# Patient Record
Sex: FEMALE | Race: BLACK OR AFRICAN AMERICAN | NOT HISPANIC OR LATINO | Employment: FULL TIME | URBAN - NONMETROPOLITAN AREA
[De-identification: names, ages, dates, MRNs, and addresses within clinical notes are randomized per-mention and may not be internally consistent; named-entity substitution may affect disease eponyms.]

---

## 2017-03-21 ENCOUNTER — OFFICE VISIT (OUTPATIENT)
Dept: PODIATRY | Facility: CLINIC | Age: 22
End: 2017-03-21

## 2017-03-21 VITALS
WEIGHT: 220 LBS | DIASTOLIC BLOOD PRESSURE: 74 MMHG | SYSTOLIC BLOOD PRESSURE: 119 MMHG | OXYGEN SATURATION: 96 % | HEART RATE: 69 BPM | HEIGHT: 68 IN | BODY MASS INDEX: 33.34 KG/M2

## 2017-03-21 DIAGNOSIS — L60.0 INGROWN TOENAIL: ICD-10-CM

## 2017-03-21 DIAGNOSIS — M79.671 RIGHT FOOT PAIN: Primary | ICD-10-CM

## 2017-03-21 PROCEDURE — 11750 EXCISION NAIL&NAIL MATRIX: CPT | Performed by: PODIATRIST

## 2017-03-21 PROCEDURE — 99203 OFFICE O/P NEW LOW 30 MIN: CPT | Performed by: PODIATRIST

## 2017-03-21 NOTE — PROGRESS NOTES
"Tanika Osito Molina  1995  21 y.o. female   Patient presents today with a complaint of recurrent ingrown nail right great toe.    3/21/2017  Chief Complaint   Patient presents with   • Right Foot - Ingrown Toenail           History of Present Illness    This is a pleasant 21-year-old female who presents to clinic today with chief complaint of right foot pain.  The pain is located to her right great toe.  States she has recurrent ingrown toenail infections.  She would like something done about them permanently.  Rates the pain as a 4 out of 10 currently.  The pain makes it hard for her to wear her shoe gear.  Denies any injuries to the area.  She has no other pedal complaints.        No past medical history on file.      No past surgical history on file.      No family history on file.      Social History     Social History   • Marital status: Single     Spouse name: N/A   • Number of children: N/A   • Years of education: N/A     Occupational History   • Not on file.     Social History Main Topics   • Smoking status: Never Smoker   • Smokeless tobacco: Not on file   • Alcohol use No   • Drug use: Not on file   • Sexual activity: Not on file     Other Topics Concern   • Not on file     Social History Narrative   • No narrative on file         No current outpatient prescriptions on file.     No current facility-administered medications for this visit.          OBJECTIVE    Visit Vitals   • /74   • Pulse 69   • Ht 68\" (172.7 cm)   • Wt 220 lb (99.8 kg)   • SpO2 96%   • BMI 33.45 kg/m2         Review of Systems   Constitutional: Negative for chills and fever.   Cardiovascular: Negative for chest pain.   Gastrointestinal: Negative for constipation, diarrhea, nausea and vomiting.   Skin: Negative for wound. ingrowing right hallux nail  Musculoskeletal: right foot pain      Constitutional: well developed, well nourished    HEENT: Normocephalic and atraumatic, normal hearing    Respiratory: Non labored " respirations noted    Cardiovascular:    DP/PT pulses palpable    CFT brisk  to all digits  Skin temp is warm to warm from proximal tibia to distal digits  Pedal hair growth present.       Musculoskeletal:  Muscle strength is 5/5 for all muscle groups tested   ROM of the 1st MTP is full without pain or crepitus  ROM of the MTJ is full without pain or crepitus    ROM of the STJ is full without pain or crepitus    ROM of the ankle joint is full without pain or crepitus    Rectus foot type     Dermatological:   Nails 1-5 are within normal limits for length and thickness .  Right hallux nails incurvated and ingrowing on the medial border.  There is tenderness palpation with mild edema no erythema noted.   Skin is warm, dry and intact    Webspaces 1-4 bilateral are clean, dry and intact.   No subcutaneous nodules or masses noted    No open wounds noted     Neurological:   Protective sensation intact    Sensation intact to light touch    DTR intact    Psychiatric: A&O x 3 with normal mood and affect. NAD.           Nail Removal  Date/Time: 3/21/2017 2:54 PM  Performed by: BUSTER SUERO  Authorized by: BUSTER SUERO   Consent: Verbal consent obtained. Written consent obtained.  Risks and benefits: risks, benefits and alternatives were discussed  Consent given by: patient  Patient understanding: patient states understanding of the procedure being performed  Patient identity confirmed: verbally with patient  Nail removal extremity: right hallux.    Anesthesia:  Local Anesthetic: lidocaine 2% without epinephrine   Preparation: skin prepped with Betadine  Amount removed: partial  Nail matrix removed: partial  Dressing: antibiotic ointment and dressing applied  Patient tolerance: Patient tolerated the procedure well with no immediate complications              ASSESSMENT AND PLAN    Tanika was seen today for ingrown toenail.    Diagnoses and all orders for this visit:    Right foot pain    Ingrown toenail    -  Comprehensive foot and ankle exam performed  - Diagnosis, prevention and treatment of ingrown toenails discussed with patient, including risks and potential benefits of nail avulsion both temporary and permanent versus simple debridement.  - Patient elected for a partial permanent nail avulsion  - Dispensed aftercare instruction sheet  - All questions were answered and the patient is in agreement with the current treatment plan.  - RTC in 2 weeks          This document has been electronically signed by Yosef Calloway DPM on March 21, 2017 2:50 PM     3/21/2017  2:50 PM

## 2017-04-04 ENCOUNTER — OFFICE VISIT (OUTPATIENT)
Dept: PODIATRY | Facility: CLINIC | Age: 22
End: 2017-04-04

## 2017-04-04 VITALS — HEIGHT: 68 IN | BODY MASS INDEX: 33.34 KG/M2 | WEIGHT: 220 LBS

## 2017-04-04 DIAGNOSIS — L60.0 INGROWN TOENAIL: Primary | ICD-10-CM

## 2017-04-04 PROCEDURE — 99212 OFFICE O/P EST SF 10 MIN: CPT | Performed by: PODIATRIST

## 2017-04-04 NOTE — PROGRESS NOTES
"Tanika Osito Manningala  1995  21 y.o. female   Patient presents today for a follow-up of the right foot.    4/4/2017  Chief Complaint   Patient presents with   • Right Foot - Follow-up           History of Present Illness    Patient presents to clinic today for follow-up of her ingrown toenail procedure.  She is doing very well at this time.  She has no pain.  She has been dressing and soaking the toe as instructed.  No new pedal complaints.        No past medical history on file.      No past surgical history on file.      No family history on file.      Social History     Social History   • Marital status: Single     Spouse name: N/A   • Number of children: N/A   • Years of education: N/A     Occupational History   • Not on file.     Social History Main Topics   • Smoking status: Never Smoker   • Smokeless tobacco: Not on file   • Alcohol use No   • Drug use: Not on file   • Sexual activity: Not on file     Other Topics Concern   • Not on file     Social History Narrative         No current outpatient prescriptions on file.     No current facility-administered medications for this visit.          OBJECTIVE    Ht 68\" (172.7 cm)  Wt 220 lb (99.8 kg)  BMI 33.45 kg/m2      Review of Systems   Constitutional: Negative for chills and fever.   Cardiovascular: Negative for chest pain.   Gastrointestinal: Negative for constipation, diarrhea, nausea and vomiting.   Skin: Negative for wound. ingrowing right hallux nail  Musculoskeletal: right foot pain      Constitutional: well developed, well nourished    HEENT: Normocephalic and atraumatic, normal hearing    Respiratory: Non labored respirations noted    Cardiovascular:    DP/PT pulses palpable    CFT brisk  to all digits  Skin temp is warm to warm from proximal tibia to distal digits  Pedal hair growth present.       Musculoskeletal:  Muscle strength is 5/5 for all muscle groups tested   ROM of the 1st MTP is full without pain or crepitus  ROM of the MTJ is full " without pain or crepitus    ROM of the STJ is full without pain or crepitus    ROM of the ankle joint is full without pain or crepitus    Rectus foot type     Dermatological:   Nails 1-5 are within normal limits for length and thickness .  Right hallux nail medial border is absent.  No signs of infection.  Skin is warm, dry and intact    Webspaces 1-4 bilateral are clean, dry and intact.   No subcutaneous nodules or masses noted    No open wounds noted     Neurological:   Protective sensation intact    Sensation intact to light touch    DTR intact    Psychiatric: A&O x 3 with normal mood and affect. NAD.           Procedures        ASSESSMENT AND PLAN    Tanika was seen today for follow-up.    Diagnoses and all orders for this visit:    Ingrown toenail    - Patient is doing well at this time.  - Continue soaking and dressing the toe until there is no drainage and a Band-Aid.  Then okay to DC soaks and bandage  - All questions were answered and the patient is in agreement with the current treatment plan.  - RTC prn            This document has been electronically signed by Yosef Calloway DPM on April 4, 2017 1:28 PM     4/4/2017  1:28 PM

## 2018-11-13 PROCEDURE — 87591 N.GONORRHOEAE DNA AMP PROB: CPT | Performed by: NURSE PRACTITIONER

## 2018-11-13 PROCEDURE — 87661 TRICHOMONAS VAGINALIS AMPLIF: CPT | Performed by: NURSE PRACTITIONER

## 2018-11-13 PROCEDURE — 87491 CHLMYD TRACH DNA AMP PROBE: CPT | Performed by: NURSE PRACTITIONER

## 2018-11-14 PROCEDURE — 87660 TRICHOMONAS VAGIN DIR PROBE: CPT | Performed by: NURSE PRACTITIONER

## 2018-11-14 PROCEDURE — 87510 GARDNER VAG DNA DIR PROBE: CPT | Performed by: NURSE PRACTITIONER

## 2018-11-14 PROCEDURE — 87480 CANDIDA DNA DIR PROBE: CPT | Performed by: NURSE PRACTITIONER

## 2018-11-16 ENCOUNTER — TRANSCRIBE ORDERS (OUTPATIENT)
Dept: URGENT CARE | Facility: CLINIC | Age: 23
End: 2018-11-16

## 2018-11-16 DIAGNOSIS — N76.0 BV (BACTERIAL VAGINOSIS): Primary | ICD-10-CM

## 2018-11-16 DIAGNOSIS — B96.89 BV (BACTERIAL VAGINOSIS): Primary | ICD-10-CM

## 2018-11-16 RX ORDER — METRONIDAZOLE 500 MG/1
500 TABLET ORAL 2 TIMES DAILY
Qty: 14 TABLET | Refills: 0 | Status: SHIPPED | OUTPATIENT
Start: 2018-11-16 | End: 2018-11-23

## 2019-06-11 ENCOUNTER — APPOINTMENT (OUTPATIENT)
Dept: ULTRASOUND IMAGING | Facility: HOSPITAL | Age: 24
End: 2019-06-11

## 2019-06-11 ENCOUNTER — HOSPITAL ENCOUNTER (EMERGENCY)
Facility: HOSPITAL | Age: 24
Discharge: HOME | End: 2019-06-11
Attending: FAMILY MEDICINE | Admitting: FAMILY MEDICINE

## 2019-06-11 VITALS
SYSTOLIC BLOOD PRESSURE: 109 MMHG | DIASTOLIC BLOOD PRESSURE: 59 MMHG | HEIGHT: 68 IN | TEMPERATURE: 97.5 F | RESPIRATION RATE: 16 BRPM | WEIGHT: 224.6 LBS | OXYGEN SATURATION: 99 % | HEART RATE: 71 BPM | BODY MASS INDEX: 34.04 KG/M2

## 2019-06-11 DIAGNOSIS — O03.9 MISCARRIAGE: ICD-10-CM

## 2019-06-11 DIAGNOSIS — N93.9 VAGINAL BLEEDING: Primary | ICD-10-CM

## 2019-06-11 LAB
ALBUMIN SERPL-MCNC: 4.1 G/DL (ref 3.5–5.2)
ALBUMIN/GLOB SERPL: 1.5 G/DL
ALP SERPL-CCNC: 57 U/L (ref 39–117)
ALT SERPL W P-5'-P-CCNC: 13 U/L (ref 1–33)
ANION GAP SERPL CALCULATED.3IONS-SCNC: 11 MMOL/L
AST SERPL-CCNC: 15 U/L (ref 1–32)
B-HCG UR QL: POSITIVE
BASOPHILS # BLD AUTO: 0.05 10*3/MM3 (ref 0–0.2)
BASOPHILS NFR BLD AUTO: 0.5 % (ref 0–1.5)
BILIRUB SERPL-MCNC: 0.3 MG/DL (ref 0.2–1.2)
BUN BLD-MCNC: 13 MG/DL (ref 6–20)
BUN/CREAT SERPL: 20 (ref 7–25)
CALCIUM SPEC-SCNC: 9.4 MG/DL (ref 8.6–10.5)
CHLORIDE SERPL-SCNC: 100 MMOL/L (ref 98–107)
CO2 SERPL-SCNC: 27 MMOL/L (ref 22–29)
CREAT BLD-MCNC: 0.65 MG/DL (ref 0.57–1)
DEPRECATED RDW RBC AUTO: 42.3 FL (ref 37–54)
EOSINOPHIL # BLD AUTO: 0.22 10*3/MM3 (ref 0–0.4)
EOSINOPHIL NFR BLD AUTO: 2 % (ref 0.3–6.2)
ERYTHROCYTE [DISTWIDTH] IN BLOOD BY AUTOMATED COUNT: 12.7 % (ref 12.3–15.4)
GFR SERPL CREATININE-BSD FRML MDRD: 138 ML/MIN/1.73
GLOBULIN UR ELPH-MCNC: 2.8 GM/DL
GLUCOSE BLD-MCNC: 100 MG/DL (ref 65–99)
HCG INTACT+B SERPL-ACNC: 787.5 MIU/ML
HCT VFR BLD AUTO: 36.2 % (ref 34–46.6)
HGB BLD-MCNC: 11.6 G/DL (ref 12–15.9)
HOLD SPECIMEN: NORMAL
HOLD SPECIMEN: NORMAL
IMM GRANULOCYTES # BLD AUTO: 0.04 10*3/MM3 (ref 0–0.05)
IMM GRANULOCYTES NFR BLD AUTO: 0.4 % (ref 0–0.5)
LYMPHOCYTES # BLD AUTO: 2.76 10*3/MM3 (ref 0.7–3.1)
LYMPHOCYTES NFR BLD AUTO: 24.9 % (ref 19.6–45.3)
MCH RBC QN AUTO: 29.1 PG (ref 26.6–33)
MCHC RBC AUTO-ENTMCNC: 32 G/DL (ref 31.5–35.7)
MCV RBC AUTO: 91 FL (ref 79–97)
MONOCYTES # BLD AUTO: 0.62 10*3/MM3 (ref 0.1–0.9)
MONOCYTES NFR BLD AUTO: 5.6 % (ref 5–12)
NEUTROPHILS # BLD AUTO: 7.41 10*3/MM3 (ref 1.7–7)
NEUTROPHILS NFR BLD AUTO: 66.6 % (ref 42.7–76)
NRBC BLD AUTO-RTO: 0 /100 WBC (ref 0–0.2)
PLATELET # BLD AUTO: 232 10*3/MM3 (ref 140–450)
PMV BLD AUTO: 9.9 FL (ref 6–12)
POTASSIUM BLD-SCNC: 3.9 MMOL/L (ref 3.5–5.2)
PROT SERPL-MCNC: 6.9 G/DL (ref 6–8.5)
RBC # BLD AUTO: 3.98 10*6/MM3 (ref 3.77–5.28)
SODIUM BLD-SCNC: 138 MMOL/L (ref 136–145)
WBC NRBC COR # BLD: 11.1 10*3/MM3 (ref 3.4–10.8)
WHOLE BLOOD HOLD SPECIMEN: NORMAL
WHOLE BLOOD HOLD SPECIMEN: NORMAL

## 2019-06-11 PROCEDURE — 80053 COMPREHEN METABOLIC PANEL: CPT | Performed by: FAMILY MEDICINE

## 2019-06-11 PROCEDURE — 99284 EMERGENCY DEPT VISIT MOD MDM: CPT

## 2019-06-11 PROCEDURE — 76830 TRANSVAGINAL US NON-OB: CPT

## 2019-06-11 PROCEDURE — 85025 COMPLETE CBC W/AUTO DIFF WBC: CPT | Performed by: FAMILY MEDICINE

## 2019-06-11 PROCEDURE — 84702 CHORIONIC GONADOTROPIN TEST: CPT | Performed by: FAMILY MEDICINE

## 2019-06-11 PROCEDURE — 81025 URINE PREGNANCY TEST: CPT | Performed by: FAMILY MEDICINE

## 2019-06-11 RX ORDER — ACETAMINOPHEN 325 MG/1
650 TABLET ORAL ONCE
Status: COMPLETED | OUTPATIENT
Start: 2019-06-11 | End: 2019-06-11

## 2019-06-11 RX ORDER — ONDANSETRON 4 MG/1
4 TABLET, ORALLY DISINTEGRATING ORAL EVERY 6 HOURS PRN
Qty: 10 TABLET | Refills: 0 | Status: SHIPPED | OUTPATIENT
Start: 2019-06-11

## 2019-06-11 RX ORDER — HYDROCODONE BITARTRATE AND ACETAMINOPHEN 5; 325 MG/1; MG/1
1 TABLET ORAL EVERY 6 HOURS PRN
Qty: 12 TABLET | Refills: 0 | Status: SHIPPED | OUTPATIENT
Start: 2019-06-11 | End: 2022-02-11

## 2019-06-11 RX ADMIN — ACETAMINOPHEN 650 MG: 325 TABLET, FILM COATED ORAL at 09:39

## 2019-06-14 ENCOUNTER — OFFICE VISIT (OUTPATIENT)
Dept: FAMILY MEDICINE CLINIC | Facility: CLINIC | Age: 24
End: 2019-06-14

## 2019-06-14 ENCOUNTER — LAB (OUTPATIENT)
Dept: LAB | Facility: HOSPITAL | Age: 24
End: 2019-06-14

## 2019-06-14 VITALS
OXYGEN SATURATION: 100 % | HEIGHT: 68 IN | BODY MASS INDEX: 32.54 KG/M2 | DIASTOLIC BLOOD PRESSURE: 82 MMHG | HEART RATE: 71 BPM | WEIGHT: 214.7 LBS | SYSTOLIC BLOOD PRESSURE: 130 MMHG

## 2019-06-14 DIAGNOSIS — O03.9 MISCARRIAGE: ICD-10-CM

## 2019-06-14 DIAGNOSIS — Z76.89 ENCOUNTER TO ESTABLISH CARE: Primary | ICD-10-CM

## 2019-06-14 PROCEDURE — 84702 CHORIONIC GONADOTROPIN TEST: CPT | Performed by: STUDENT IN AN ORGANIZED HEALTH CARE EDUCATION/TRAINING PROGRAM

## 2019-06-14 PROCEDURE — 36415 COLL VENOUS BLD VENIPUNCTURE: CPT | Performed by: STUDENT IN AN ORGANIZED HEALTH CARE EDUCATION/TRAINING PROGRAM

## 2019-06-14 PROCEDURE — 99203 OFFICE O/P NEW LOW 30 MIN: CPT | Performed by: STUDENT IN AN ORGANIZED HEALTH CARE EDUCATION/TRAINING PROGRAM

## 2019-06-14 NOTE — PROGRESS NOTES
ID: Tanika Molina    CC:   Chief Complaint   Patient presents with   • Establish Care     recent miscarriage       Subjective:     HPI     Tanika Molina is a 23 y.o. female who presents for ER follow-up and establish care.  She was seen in the emergency room for heavy bleeding.  She was diagnosed with miscarriage.  She reports that the bleeding has decreased somewhat.    She has history of irregular periods, menarche at 14.  Her periods typically last 4 to 12 days.  Sometimes they will occur every 28 days, sometimes she will skip a month.  She is sexually active and has inconsistent condom usage.  Her bleeding was a little bit worse shortly after the ER visit, she is also having some mild low back pain that became moderate when she is working.  She reports some cramping which is improving.  Overall the bleeding is decreasing.  No other concerns or complaints today.    Preventative:  Over the past 2 weeks, have you felt down, depressed, or hopeless? no  Over the past 2 weeks, have you felt little interest or pleasure in doing things? no  Clinical depression screening refused by patient no  PHQ9: 0  On osteoporosis therapy? no    Past Medical Hx:  Past Medical History:   Diagnosis Date   • Anemia    • Asthma        Past Surgical Hx:  Past Surgical History:   Procedure Laterality Date   • ADENOIDECTOMY     • CHOLECYSTECTOMY     • TONSILLECTOMY         Health Maintenance:  Health Maintenance   Topic Date Due   • ANNUAL PHYSICAL  11/14/1999   • HPV VACCINES (1 - Female 3-dose series) 11/14/2011   • PAP SMEAR  11/14/2017   • TDAP/TD VACCINES (2 - Td) 04/26/2019   • INFLUENZA VACCINE  08/01/2019   • CHLAMYDIA SCREENING  11/15/2019   • MENINGOCOCCAL VACCINE (Normal Risk)  Aged Out       Current Meds:    Current Outpatient Medications:   •  HYDROcodone-acetaminophen (NORCO) 5-325 MG per tablet, Take 1 tablet by mouth Every 6 (Six) Hours As Needed for Moderate Pain ., Disp: 12 tablet, Rfl: 0  •  ondansetron ODT  (ZOFRAN-ODT) 4 MG disintegrating tablet, Take 1 tablet by mouth Every 6 (Six) Hours As Needed for Nausea or Vomiting., Disp: 10 tablet, Rfl: 0    Allergies:  Erythromycin; Other; Sulfa antibiotics; and Vantin [cefpodoxime]    Family Hx:  Family History   Problem Relation Age of Onset   • No Known Problems Mother    • No Known Problems Father    • No Known Problems Sister    • No Known Problems Brother    • No Known Problems Daughter    • No Known Problems Son    • No Known Problems Maternal Aunt    • No Known Problems Maternal Uncle    • No Known Problems Paternal Aunt    • No Known Problems Paternal Uncle    • No Known Problems Maternal Grandmother    • No Known Problems Maternal Grandfather    • No Known Problems Paternal Grandmother    • No Known Problems Paternal Grandfather         Social History:  Social History     Socioeconomic History   • Marital status: Single     Spouse name: Not on file   • Number of children: Not on file   • Years of education: Not on file   • Highest education level: Not on file   Tobacco Use   • Smoking status: Never Smoker   • Smokeless tobacco: Never Used   Substance and Sexual Activity   • Alcohol use: Yes     Comment: socially   • Drug use: No       Review of Systems   Constitutional: Negative for activity change, appetite change, fatigue and fever.   HENT: Negative for ear pain and sore throat.    Eyes: Negative for pain and visual disturbance.   Respiratory: Negative for cough and shortness of breath.    Cardiovascular: Negative for chest pain and palpitations.   Gastrointestinal: Negative for abdominal pain and nausea.   Genitourinary: Positive for menstrual problem, pelvic pain and vaginal bleeding. Negative for difficulty urinating and dysuria.   Musculoskeletal: Positive for back pain. Negative for arthralgias and gait problem.   Skin: Negative for color change and rash.   Neurological: Negative for dizziness, weakness and headaches.   Psychiatric/Behavioral: Negative for  "dysphoric mood and sleep disturbance. The patient is not nervous/anxious.            Objective:     /82   Pulse 71   Ht 172.7 cm (68\")   Wt 97.4 kg (214 lb 11.2 oz)   SpO2 100%   BMI 32.65 kg/m²     Physical Exam   Constitutional: She appears well-developed and well-nourished.   HENT:   Head: Normocephalic and atraumatic.   Eyes: Conjunctivae are normal. Pupils are equal, round, and reactive to light.   Neck: Normal range of motion. Neck supple.   Cardiovascular: Normal rate, regular rhythm and normal heart sounds.   Pulmonary/Chest: Effort normal and breath sounds normal.   Abdominal: Soft. Bowel sounds are normal. She exhibits no distension.   Genitourinary: Vagina normal. Uterus is not tender. Cervix exhibits no motion tenderness and no discharge. Right adnexum displays no mass and no tenderness. Left adnexum displays no mass and no tenderness. No bleeding in the vagina.   Musculoskeletal: Normal range of motion.   Neurological: She is alert.   Skin: Skin is warm and dry.   Psychiatric: She has a normal mood and affect. Her behavior is normal.   Nursing note and vitals reviewed.         Assessment/Plan:     Tanika was seen today for establish care.    Diagnoses and all orders for this visit:    Encounter to establish care    Miscarriage  -     HCG, B-subunit, Quantitative  -     HCG, B-subunit, Quantitative; Future  -     HCG, B-subunit, Quantitative; Future  -     US Ob Transvaginal; Future    Will get a repeat ultrasound and trend the hCG downward.      Follow-up:     Return in about 2 weeks (around 6/28/2019) for Recheck.      Goals:   Preventative care    Barriers to goals: None    Health Maintenance   Topic Date Due   • ANNUAL PHYSICAL  11/14/1999   • HPV VACCINES (1 - Female 3-dose series) 11/14/2011   • PAP SMEAR  11/14/2017   • TDAP/TD VACCINES (2 - Td) 04/26/2019   • INFLUENZA VACCINE  08/01/2019   • CHLAMYDIA SCREENING  11/15/2019   • MENINGOCOCCAL VACCINE (Normal Risk)  Aged Out "       Tobacco: nonsmoker  Alcohol: occasional/rare  Lifestyle: Patient's Body mass index is 32.65 kg/m². BMI is above normal parameters. Recommendations include: exercise counseling and nutrition counseling.   eat more fruits and vegetables, decrease soda or juice intake, increase water intake and increase physical activity    RISK SCORE: 3          This document has been electronically signed by Kuldip Peralta MD on June 14, 2019 4:22 PM

## 2019-06-16 LAB — HCG INTACT+B SERPL-ACNC: 593 MIU/ML

## 2019-06-24 ENCOUNTER — APPOINTMENT (OUTPATIENT)
Dept: LAB | Facility: HOSPITAL | Age: 24
End: 2019-06-24

## 2019-06-24 DIAGNOSIS — O03.9 MISCARRIAGE: Primary | ICD-10-CM

## 2019-06-24 PROCEDURE — 36415 COLL VENOUS BLD VENIPUNCTURE: CPT | Performed by: STUDENT IN AN ORGANIZED HEALTH CARE EDUCATION/TRAINING PROGRAM

## 2019-06-24 PROCEDURE — 84702 CHORIONIC GONADOTROPIN TEST: CPT | Performed by: STUDENT IN AN ORGANIZED HEALTH CARE EDUCATION/TRAINING PROGRAM

## 2019-06-26 LAB — HCG INTACT+B SERPL-ACNC: 23 MIU/ML

## 2021-11-11 ENCOUNTER — APPOINTMENT (OUTPATIENT)
Dept: CT IMAGING | Facility: HOSPITAL | Age: 26
End: 2021-11-11

## 2021-11-11 ENCOUNTER — APPOINTMENT (OUTPATIENT)
Dept: GENERAL RADIOLOGY | Facility: HOSPITAL | Age: 26
End: 2021-11-11

## 2021-11-11 ENCOUNTER — HOSPITAL ENCOUNTER (EMERGENCY)
Facility: HOSPITAL | Age: 26
Discharge: ANOTHER HOSPITAL | End: 2021-11-11
Attending: EMERGENCY MEDICINE | Admitting: EMERGENCY MEDICINE

## 2021-11-11 VITALS
HEART RATE: 76 BPM | HEIGHT: 68 IN | WEIGHT: 240 LBS | BODY MASS INDEX: 36.37 KG/M2 | RESPIRATION RATE: 31 BRPM | SYSTOLIC BLOOD PRESSURE: 107 MMHG | OXYGEN SATURATION: 99 % | DIASTOLIC BLOOD PRESSURE: 60 MMHG | TEMPERATURE: 98.2 F

## 2021-11-11 DIAGNOSIS — S22.42XA CLOSED FRACTURE OF MULTIPLE RIBS OF LEFT SIDE, INITIAL ENCOUNTER: Primary | ICD-10-CM

## 2021-11-11 DIAGNOSIS — S42.112A CLOSED DISPLACED FRACTURE OF BODY OF LEFT SCAPULA, INITIAL ENCOUNTER: ICD-10-CM

## 2021-11-11 LAB
ALBUMIN SERPL-MCNC: 4.4 G/DL (ref 3.5–5.2)
ALBUMIN/GLOB SERPL: 1.5 G/DL
ALP SERPL-CCNC: 65 U/L (ref 39–117)
ALT SERPL W P-5'-P-CCNC: 27 U/L (ref 1–33)
ANION GAP SERPL CALCULATED.3IONS-SCNC: 10 MMOL/L (ref 5–15)
AST SERPL-CCNC: 41 U/L (ref 1–32)
BACTERIA UR QL AUTO: ABNORMAL /HPF
BASOPHILS # BLD AUTO: 0.06 10*3/MM3 (ref 0–0.2)
BASOPHILS NFR BLD AUTO: 0.4 % (ref 0–1.5)
BILIRUB SERPL-MCNC: 0.2 MG/DL (ref 0–1.2)
BILIRUB UR QL STRIP: NEGATIVE
BUN SERPL-MCNC: 12 MG/DL (ref 6–20)
BUN/CREAT SERPL: 15.8 (ref 7–25)
CALCIUM SPEC-SCNC: 9.2 MG/DL (ref 8.6–10.5)
CHLORIDE SERPL-SCNC: 102 MMOL/L (ref 98–107)
CLARITY UR: CLEAR
CO2 SERPL-SCNC: 25 MMOL/L (ref 22–29)
COLOR UR: YELLOW
CREAT SERPL-MCNC: 0.76 MG/DL (ref 0.57–1)
DEPRECATED RDW RBC AUTO: 40.5 FL (ref 37–54)
EOSINOPHIL # BLD AUTO: 0.39 10*3/MM3 (ref 0–0.4)
EOSINOPHIL NFR BLD AUTO: 2.3 % (ref 0.3–6.2)
ERYTHROCYTE [DISTWIDTH] IN BLOOD BY AUTOMATED COUNT: 12.6 % (ref 12.3–15.4)
ETHANOL BLD-MCNC: <10 MG/DL (ref 0–10)
ETHANOL UR QL: <0.01 %
GFR SERPL CREATININE-BSD FRML MDRD: 113 ML/MIN/1.73
GLOBULIN UR ELPH-MCNC: 3 GM/DL
GLUCOSE SERPL-MCNC: 103 MG/DL (ref 65–99)
GLUCOSE UR STRIP-MCNC: NEGATIVE MG/DL
HCG SERPL QL: NEGATIVE
HCT VFR BLD AUTO: 38 % (ref 34–46.6)
HGB BLD-MCNC: 12.6 G/DL (ref 12–15.9)
HGB UR QL STRIP.AUTO: ABNORMAL
HOLD SPECIMEN: NORMAL
HOLD SPECIMEN: NORMAL
HYALINE CASTS UR QL AUTO: ABNORMAL /LPF
IMM GRANULOCYTES # BLD AUTO: 0.15 10*3/MM3 (ref 0–0.05)
IMM GRANULOCYTES NFR BLD AUTO: 0.9 % (ref 0–0.5)
KETONES UR QL STRIP: NEGATIVE
LEUKOCYTE ESTERASE UR QL STRIP.AUTO: NEGATIVE
LYMPHOCYTES # BLD AUTO: 5.16 10*3/MM3 (ref 0.7–3.1)
LYMPHOCYTES NFR BLD AUTO: 31 % (ref 19.6–45.3)
MCH RBC QN AUTO: 29.4 PG (ref 26.6–33)
MCHC RBC AUTO-ENTMCNC: 33.2 G/DL (ref 31.5–35.7)
MCV RBC AUTO: 88.8 FL (ref 79–97)
MONOCYTES # BLD AUTO: 0.8 10*3/MM3 (ref 0.1–0.9)
MONOCYTES NFR BLD AUTO: 4.8 % (ref 5–12)
NEUTROPHILS NFR BLD AUTO: 10.11 10*3/MM3 (ref 1.7–7)
NEUTROPHILS NFR BLD AUTO: 60.6 % (ref 42.7–76)
NITRITE UR QL STRIP: NEGATIVE
NRBC BLD AUTO-RTO: 0 /100 WBC (ref 0–0.2)
PH UR STRIP.AUTO: 6.5 [PH] (ref 5–9)
PLATELET # BLD AUTO: 224 10*3/MM3 (ref 140–450)
PMV BLD AUTO: 10.3 FL (ref 6–12)
POTASSIUM SERPL-SCNC: 3.5 MMOL/L (ref 3.5–5.2)
PROT SERPL-MCNC: 7.4 G/DL (ref 6–8.5)
PROT UR QL STRIP: NEGATIVE
RBC # BLD AUTO: 4.28 10*6/MM3 (ref 3.77–5.28)
RBC # UR: ABNORMAL /HPF
REF LAB TEST METHOD: ABNORMAL
SODIUM SERPL-SCNC: 137 MMOL/L (ref 136–145)
SP GR UR STRIP: 1.02 (ref 1–1.03)
SQUAMOUS #/AREA URNS HPF: ABNORMAL /HPF
TROPONIN T SERPL-MCNC: <0.01 NG/ML (ref 0–0.03)
UROBILINOGEN UR QL STRIP: ABNORMAL
WBC # BLD AUTO: 16.67 10*3/MM3 (ref 3.4–10.8)
WBC UR QL AUTO: ABNORMAL /HPF
WHOLE BLOOD HOLD SPECIMEN: NORMAL
WHOLE BLOOD HOLD SPECIMEN: NORMAL

## 2021-11-11 PROCEDURE — 72128 CT CHEST SPINE W/O DYE: CPT

## 2021-11-11 PROCEDURE — 99285 EMERGENCY DEPT VISIT HI MDM: CPT

## 2021-11-11 PROCEDURE — 72125 CT NECK SPINE W/O DYE: CPT

## 2021-11-11 PROCEDURE — 25010000002 KETOROLAC TROMETHAMINE PER 15 MG: Performed by: EMERGENCY MEDICINE

## 2021-11-11 PROCEDURE — 73030 X-RAY EXAM OF SHOULDER: CPT

## 2021-11-11 PROCEDURE — 74177 CT ABD & PELVIS W/CONTRAST: CPT

## 2021-11-11 PROCEDURE — 73590 X-RAY EXAM OF LOWER LEG: CPT

## 2021-11-11 PROCEDURE — 93010 ELECTROCARDIOGRAM REPORT: CPT | Performed by: INTERNAL MEDICINE

## 2021-11-11 PROCEDURE — 81001 URINALYSIS AUTO W/SCOPE: CPT | Performed by: EMERGENCY MEDICINE

## 2021-11-11 PROCEDURE — 80053 COMPREHEN METABOLIC PANEL: CPT | Performed by: EMERGENCY MEDICINE

## 2021-11-11 PROCEDURE — 84484 ASSAY OF TROPONIN QUANT: CPT | Performed by: EMERGENCY MEDICINE

## 2021-11-11 PROCEDURE — 71260 CT THORAX DX C+: CPT

## 2021-11-11 PROCEDURE — 73552 X-RAY EXAM OF FEMUR 2/>: CPT

## 2021-11-11 PROCEDURE — 72131 CT LUMBAR SPINE W/O DYE: CPT

## 2021-11-11 PROCEDURE — 82077 ASSAY SPEC XCP UR&BREATH IA: CPT | Performed by: EMERGENCY MEDICINE

## 2021-11-11 PROCEDURE — 96374 THER/PROPH/DIAG INJ IV PUSH: CPT

## 2021-11-11 PROCEDURE — 85025 COMPLETE CBC W/AUTO DIFF WBC: CPT | Performed by: EMERGENCY MEDICINE

## 2021-11-11 PROCEDURE — 25010000002 IOPAMIDOL 61 % SOLUTION: Performed by: EMERGENCY MEDICINE

## 2021-11-11 PROCEDURE — 84703 CHORIONIC GONADOTROPIN ASSAY: CPT | Performed by: EMERGENCY MEDICINE

## 2021-11-11 PROCEDURE — 70450 CT HEAD/BRAIN W/O DYE: CPT

## 2021-11-11 PROCEDURE — 93005 ELECTROCARDIOGRAM TRACING: CPT | Performed by: EMERGENCY MEDICINE

## 2021-11-11 RX ORDER — KETOROLAC TROMETHAMINE 30 MG/ML
30 INJECTION, SOLUTION INTRAMUSCULAR; INTRAVENOUS ONCE
Status: COMPLETED | OUTPATIENT
Start: 2021-11-11 | End: 2021-11-11

## 2021-11-11 RX ADMIN — IOPAMIDOL 95 ML: 612 INJECTION, SOLUTION INTRAVENOUS at 02:58

## 2021-11-11 RX ADMIN — SODIUM CHLORIDE 1000 ML: 9 INJECTION, SOLUTION INTRAVENOUS at 02:18

## 2021-11-11 RX ADMIN — KETOROLAC TROMETHAMINE 30 MG: 30 INJECTION, SOLUTION INTRAMUSCULAR at 02:18
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
## 2021-11-11 NOTE — ED NOTES
MD contacted [facility] transfer [place] for pt transfer     Birgit Pino, RN  11/11/21 8893
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
## 2021-11-11 NOTE — ED NOTES
Pt shirt removed and placed in gown.  Pt remains in supine position with C collar in place.  Family notified of plan of care and transfer status     Birgit Pino RN  11/11/21 [id number]

## 2021-11-11 NOTE — ED NOTES
Bruising and tender area noted to outer left thigh.  MD notified     Birgit Pino, RN  11/11/21 6413

## 2021-11-15 ENCOUNTER — TRANSCRIBE ORDERS (OUTPATIENT)
Dept: OCCUPATIONAL THERAPY | Facility: HOSPITAL | Age: 26
End: 2021-11-15

## 2021-11-15 DIAGNOSIS — M25.519 SHOULDER PAIN, UNSPECIFIED CHRONICITY, UNSPECIFIED LATERALITY: Primary | ICD-10-CM

## 2021-11-15 LAB
QT INTERVAL: 394 MS
QTC INTERVAL: 434 MS

## 2021-11-16 ENCOUNTER — HOSPITAL ENCOUNTER (OUTPATIENT)
Dept: OCCUPATIONAL THERAPY | Facility: HOSPITAL | Age: 26
Setting detail: THERAPIES SERIES
Discharge: HOME | End: 2021-11-16

## 2021-11-16 DIAGNOSIS — S42.115D CLOSED NONDISPLACED FRACTURE OF BODY OF LEFT SCAPULA WITH ROUTINE HEALING, SUBSEQUENT ENCOUNTER: Primary | ICD-10-CM

## 2021-11-16 DIAGNOSIS — Z78.9 IMPAIRED INSTRUMENTAL ACTIVITIES OF DAILY LIVING (IADL): ICD-10-CM

## 2021-11-16 DIAGNOSIS — Z74.09 IMPAIRED MOBILITY AND ADLS: ICD-10-CM

## 2021-11-16 DIAGNOSIS — Z78.9 IMPAIRED MOBILITY AND ADLS: ICD-10-CM

## 2021-11-16 DIAGNOSIS — M25.612 DECREASED ROM OF LEFT SHOULDER: ICD-10-CM

## 2021-11-16 PROCEDURE — 97166 OT EVAL MOD COMPLEX 45 MIN: CPT
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
## 2021-11-18 ENCOUNTER — HOSPITAL ENCOUNTER (OUTPATIENT)
Dept: OCCUPATIONAL THERAPY | Facility: HOSPITAL | Age: 26
Setting detail: THERAPIES SERIES
Discharge: HOME | End: 2021-11-18

## 2021-11-18 DIAGNOSIS — Z78.9 IMPAIRED MOBILITY AND ADLS: ICD-10-CM

## 2021-11-18 DIAGNOSIS — Z74.09 IMPAIRED MOBILITY AND ADLS: ICD-10-CM

## 2021-11-18 DIAGNOSIS — M25.612 DECREASED ROM OF LEFT SHOULDER: ICD-10-CM

## 2021-11-18 DIAGNOSIS — S42.115D CLOSED NONDISPLACED FRACTURE OF BODY OF LEFT SCAPULA WITH ROUTINE HEALING, SUBSEQUENT ENCOUNTER: Primary | ICD-10-CM

## 2021-11-18 DIAGNOSIS — Z78.9 IMPAIRED INSTRUMENTAL ACTIVITIES OF DAILY LIVING (IADL): ICD-10-CM

## 2021-11-18 PROCEDURE — 97110 THERAPEUTIC EXERCISES: CPT

## 2021-11-18 PROCEDURE — 97140 MANUAL THERAPY 1/> REGIONS: CPT
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
## 2021-11-22 ENCOUNTER — OFFICE VISIT (OUTPATIENT)
Dept: FAMILY MEDICINE CLINIC | Facility: CLINIC | Age: 26
End: 2021-11-22

## 2021-11-22 ENCOUNTER — HOSPITAL ENCOUNTER (OUTPATIENT)
Dept: OCCUPATIONAL THERAPY | Facility: HOSPITAL | Age: 26
Setting detail: THERAPIES SERIES
Discharge: HOME | End: 2021-11-22

## 2021-11-22 VITALS
WEIGHT: 258.6 LBS | DIASTOLIC BLOOD PRESSURE: 80 MMHG | SYSTOLIC BLOOD PRESSURE: 115 MMHG | HEIGHT: 68 IN | HEART RATE: 86 BPM | BODY MASS INDEX: 39.19 KG/M2 | OXYGEN SATURATION: 99 %

## 2021-11-22 DIAGNOSIS — M25.612 DECREASED ROM OF LEFT SHOULDER: ICD-10-CM

## 2021-11-22 DIAGNOSIS — Z76.89 ENCOUNTER TO ESTABLISH CARE: ICD-10-CM

## 2021-11-22 DIAGNOSIS — S42.115D CLOSED NONDISPLACED FRACTURE OF BODY OF LEFT SCAPULA WITH ROUTINE HEALING, SUBSEQUENT ENCOUNTER: Primary | ICD-10-CM

## 2021-11-22 DIAGNOSIS — Z78.9 IMPAIRED INSTRUMENTAL ACTIVITIES OF DAILY LIVING (IADL): ICD-10-CM

## 2021-11-22 DIAGNOSIS — Z74.09 IMPAIRED MOBILITY AND ADLS: ICD-10-CM
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
## 2021-11-22 DIAGNOSIS — F43.10 PTSD (POST-TRAUMATIC STRESS DISORDER): Primary | ICD-10-CM

## 2021-11-22 DIAGNOSIS — Z78.9 IMPAIRED MOBILITY AND ADLS: ICD-10-CM

## 2021-11-22 PROCEDURE — 97110 THERAPEUTIC EXERCISES: CPT

## 2021-11-22 PROCEDURE — 99213 OFFICE O/P EST LOW 20 MIN: CPT | Performed by: STUDENT IN AN ORGANIZED HEALTH CARE EDUCATION/TRAINING PROGRAM

## 2021-11-22 RX ORDER — METHOCARBAMOL 750 MG/1
TABLET, FILM COATED ORAL DAILY PRN
COMMUNITY
Start: 2021-11-18 | End: 2022-02-11

## 2021-11-22 RX ORDER — BUTALBITAL, ACETAMINOPHEN AND CAFFEINE 50; 325; 40 MG/1; MG/1; MG/1
1 TABLET ORAL EVERY 6 HOURS PRN
COMMUNITY
Start: 2021-11-13

## 2021-11-22 RX ORDER — ONDANSETRON 4 MG/1
TABLET, FILM COATED ORAL DAILY PRN
COMMUNITY
Start: 2021-11-18

## 2021-11-22 RX ORDER — TRAMADOL HYDROCHLORIDE 50 MG/1
TABLET ORAL DAILY PRN
COMMUNITY
Start: 2021-11-13 | End: 2022-02-11
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
## 2021-11-24 ENCOUNTER — HOSPITAL ENCOUNTER (OUTPATIENT)
Dept: OCCUPATIONAL THERAPY | Facility: HOSPITAL | Age: 26
Setting detail: THERAPIES SERIES
Discharge: HOME | End: 2021-11-24

## 2021-11-24 DIAGNOSIS — M25.612 DECREASED ROM OF LEFT SHOULDER: ICD-10-CM

## 2021-11-24 DIAGNOSIS — S42.115D CLOSED NONDISPLACED FRACTURE OF BODY OF LEFT SCAPULA WITH ROUTINE HEALING, SUBSEQUENT ENCOUNTER: Primary | ICD-10-CM

## 2021-11-24 DIAGNOSIS — Z78.9 IMPAIRED MOBILITY AND ADLS: ICD-10-CM

## 2021-11-24 DIAGNOSIS — Z78.9 IMPAIRED INSTRUMENTAL ACTIVITIES OF DAILY LIVING (IADL): ICD-10-CM

## 2021-11-24 DIAGNOSIS — Z74.09 IMPAIRED MOBILITY AND ADLS: ICD-10-CM

## 2021-11-24 PROCEDURE — 97110 THERAPEUTIC EXERCISES: CPT
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
## 2021-11-29 ENCOUNTER — APPOINTMENT (OUTPATIENT)
Dept: OCCUPATIONAL THERAPY | Facility: HOSPITAL | Age: 26
End: 2021-11-29

## 2021-12-01 ENCOUNTER — HOSPITAL ENCOUNTER (OUTPATIENT)
Dept: OCCUPATIONAL THERAPY | Facility: HOSPITAL | Age: 26
Setting detail: THERAPIES SERIES
Discharge: HOME | End: 2021-12-01

## 2021-12-01 DIAGNOSIS — M25.612 DECREASED ROM OF LEFT SHOULDER: ICD-10-CM

## 2021-12-01 DIAGNOSIS — Z74.09 IMPAIRED MOBILITY AND ADLS: ICD-10-CM

## 2021-12-01 DIAGNOSIS — Z78.9 IMPAIRED INSTRUMENTAL ACTIVITIES OF DAILY LIVING (IADL): ICD-10-CM

## 2021-12-01 DIAGNOSIS — S42.115D CLOSED NONDISPLACED FRACTURE OF BODY OF LEFT SCAPULA WITH ROUTINE HEALING, SUBSEQUENT ENCOUNTER: Primary | ICD-10-CM

## 2021-12-01 DIAGNOSIS — Z78.9 IMPAIRED MOBILITY AND ADLS: ICD-10-CM

## 2021-12-01 PROCEDURE — 97110 THERAPEUTIC EXERCISES: CPT
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
## 2021-12-02 ENCOUNTER — TRANSCRIBE ORDERS (OUTPATIENT)
Dept: PHYSICAL THERAPY | Facility: HOSPITAL | Age: 26
End: 2021-12-02

## 2021-12-02 DIAGNOSIS — G44.319 ACUTE POST-TRAUMATIC HEADACHE, NOT INTRACTABLE: ICD-10-CM

## 2021-12-02 DIAGNOSIS — R94.113 ABNORMAL RESULT OF EVALUATION OF OCULOMOTOR SYSTEM: ICD-10-CM

## 2021-12-02 DIAGNOSIS — S06.0X0A CONCUSSION WITHOUT LOSS OF CONSCIOUSNESS, INITIAL ENCOUNTER: Primary | ICD-10-CM

## 2021-12-06 ENCOUNTER — HOSPITAL ENCOUNTER (OUTPATIENT)
Dept: OCCUPATIONAL THERAPY | Facility: HOSPITAL | Age: 26
Setting detail: THERAPIES SERIES
Discharge: HOME | End: 2021-12-06

## 2021-12-06 DIAGNOSIS — Z74.09 IMPAIRED MOBILITY AND ADLS: ICD-10-CM

## 2021-12-06 DIAGNOSIS — S42.115D CLOSED NONDISPLACED FRACTURE OF BODY OF LEFT SCAPULA WITH ROUTINE HEALING, SUBSEQUENT ENCOUNTER: Primary | ICD-10-CM

## 2021-12-06 DIAGNOSIS — M25.612 DECREASED ROM OF LEFT SHOULDER: ICD-10-CM

## 2021-12-06 DIAGNOSIS — Z78.9 IMPAIRED INSTRUMENTAL ACTIVITIES OF DAILY LIVING (IADL): ICD-10-CM

## 2021-12-06 DIAGNOSIS — Z78.9 IMPAIRED MOBILITY AND ADLS: ICD-10-CM

## 2021-12-06 PROCEDURE — 97110 THERAPEUTIC EXERCISES: CPT
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
## 2021-12-09 ENCOUNTER — HOSPITAL ENCOUNTER (OUTPATIENT)
Dept: OCCUPATIONAL THERAPY | Facility: HOSPITAL | Age: 26
Setting detail: THERAPIES SERIES
Discharge: HOME | End: 2021-12-09

## 2021-12-09 DIAGNOSIS — Z78.9 IMPAIRED INSTRUMENTAL ACTIVITIES OF DAILY LIVING (IADL): ICD-10-CM

## 2021-12-09 DIAGNOSIS — M25.612 DECREASED ROM OF LEFT SHOULDER: ICD-10-CM

## 2021-12-09 DIAGNOSIS — Z74.09 IMPAIRED MOBILITY AND ADLS: ICD-10-CM

## 2021-12-09 DIAGNOSIS — Z78.9 IMPAIRED MOBILITY AND ADLS: ICD-10-CM

## 2021-12-09 DIAGNOSIS — S42.115D CLOSED NONDISPLACED FRACTURE OF BODY OF LEFT SCAPULA WITH ROUTINE HEALING, SUBSEQUENT ENCOUNTER: Primary | ICD-10-CM

## 2021-12-09 PROCEDURE — 97110 THERAPEUTIC EXERCISES: CPT

## 2021-12-09 PROCEDURE — 97530 THERAPEUTIC ACTIVITIES: CPT
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
## 2021-12-13 ENCOUNTER — HOSPITAL ENCOUNTER (OUTPATIENT)
Dept: PHYSICAL THERAPY | Facility: HOSPITAL | Age: 26
Setting detail: THERAPIES SERIES
Discharge: HOME | End: 2021-12-13

## 2021-12-13 ENCOUNTER — HOSPITAL ENCOUNTER (OUTPATIENT)
Dept: OCCUPATIONAL THERAPY | Facility: HOSPITAL | Age: 26
Setting detail: THERAPIES SERIES
Discharge: HOME | End: 2021-12-13

## 2021-12-13 DIAGNOSIS — S42.115D CLOSED NONDISPLACED FRACTURE OF BODY OF LEFT SCAPULA WITH ROUTINE HEALING, SUBSEQUENT ENCOUNTER: Primary | ICD-10-CM

## 2021-12-13 DIAGNOSIS — Z78.9 IMPAIRED MOBILITY AND ADLS: ICD-10-CM

## 2021-12-13 DIAGNOSIS — S06.0X0A CONCUSSION WITHOUT LOSS OF CONSCIOUSNESS, INITIAL ENCOUNTER: Primary | ICD-10-CM

## 2021-12-13 DIAGNOSIS — R94.113 ABNORMAL RESULT OF EVALUATION OF OCULOMOTOR SYSTEM: ICD-10-CM

## 2021-12-13 DIAGNOSIS — Z74.09 IMPAIRED MOBILITY AND ADLS: ICD-10-CM

## 2021-12-13 DIAGNOSIS — G44.319 ACUTE POST-TRAUMATIC HEADACHE, NOT INTRACTABLE: ICD-10-CM

## 2021-12-13 DIAGNOSIS — M25.612 DECREASED ROM OF LEFT SHOULDER: ICD-10-CM

## 2021-12-13 DIAGNOSIS — Z78.9 IMPAIRED INSTRUMENTAL ACTIVITIES OF DAILY LIVING (IADL): ICD-10-CM

## 2021-12-13 PROCEDURE — 97163 PT EVAL HIGH COMPLEX 45 MIN: CPT | Performed by: PHYSICAL THERAPIST

## 2021-12-13 PROCEDURE — 97110 THERAPEUTIC EXERCISES: CPT
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
## 2021-12-14 ENCOUNTER — OFFICE VISIT (OUTPATIENT)
Dept: FAMILY MEDICINE CLINIC | Facility: CLINIC | Age: 26
End: 2021-12-14

## 2021-12-14 VITALS
WEIGHT: 262.5 LBS | HEART RATE: 68 BPM | BODY MASS INDEX: 39.78 KG/M2 | SYSTOLIC BLOOD PRESSURE: 122 MMHG | OXYGEN SATURATION: 99 % | HEIGHT: 68 IN | DIASTOLIC BLOOD PRESSURE: 72 MMHG

## 2021-12-14 DIAGNOSIS — F43.10 PTSD (POST-TRAUMATIC STRESS DISORDER): Primary | ICD-10-CM

## 2021-12-14 PROCEDURE — 99213 OFFICE O/P EST LOW 20 MIN: CPT | Performed by: STUDENT IN AN ORGANIZED HEALTH CARE EDUCATION/TRAINING PROGRAM

## 2021-12-14 RX ORDER — AMITRIPTYLINE HYDROCHLORIDE 10 MG/1
TABLET, FILM COATED ORAL
COMMUNITY
Start: 2021-11-29
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
## 2021-12-16 ENCOUNTER — APPOINTMENT (OUTPATIENT)
Dept: OCCUPATIONAL THERAPY | Facility: HOSPITAL | Age: 26
End: 2021-12-16

## 2021-12-16 ENCOUNTER — APPOINTMENT (OUTPATIENT)
Dept: PHYSICAL THERAPY | Facility: HOSPITAL | Age: 26
End: 2021-12-16

## 2021-12-16 NOTE — PROGRESS NOTES
I have spoken with the patient .     I have reviewed the notes, assessments, and/or procedures performed by Dr. Pina Hyman,   I concur with   her  documentation and assessment and plan for Tanika Molina.          This document has been electronically signed by eRagan Rizvi MD on December 16, 2021 11:35 CST

## 2021-12-21 ENCOUNTER — HOSPITAL ENCOUNTER (OUTPATIENT)
Dept: OCCUPATIONAL THERAPY | Facility: HOSPITAL | Age: 26
Setting detail: THERAPIES SERIES
Discharge: HOME | End: 2021-12-21

## 2021-12-21 ENCOUNTER — HOSPITAL ENCOUNTER (OUTPATIENT)
Dept: PHYSICAL THERAPY | Facility: HOSPITAL | Age: 26
Setting detail: THERAPIES SERIES
Discharge: HOME | End: 2021-12-21

## 2021-12-21 DIAGNOSIS — Z74.09 IMPAIRED MOBILITY AND ADLS: ICD-10-CM

## 2021-12-21 DIAGNOSIS — R94.113 ABNORMAL RESULT OF EVALUATION OF OCULOMOTOR SYSTEM: ICD-10-CM

## 2021-12-21 DIAGNOSIS — Z78.9 IMPAIRED MOBILITY AND ADLS: ICD-10-CM

## 2021-12-21 DIAGNOSIS — Z78.9 IMPAIRED INSTRUMENTAL ACTIVITIES OF DAILY LIVING (IADL): ICD-10-CM

## 2021-12-21 DIAGNOSIS — G44.319 ACUTE POST-TRAUMATIC HEADACHE, NOT INTRACTABLE: ICD-10-CM
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
## 2021-12-21 DIAGNOSIS — S06.0X0A CONCUSSION WITHOUT LOSS OF CONSCIOUSNESS, INITIAL ENCOUNTER: Primary | ICD-10-CM

## 2021-12-21 DIAGNOSIS — S42.115D CLOSED NONDISPLACED FRACTURE OF BODY OF LEFT SCAPULA WITH ROUTINE HEALING, SUBSEQUENT ENCOUNTER: Primary | ICD-10-CM

## 2021-12-21 PROCEDURE — 97110 THERAPEUTIC EXERCISES: CPT

## 2021-12-21 PROCEDURE — 97530 THERAPEUTIC ACTIVITIES: CPT | Performed by: PHYSICAL THERAPIST
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
## 2021-12-29 ENCOUNTER — HOSPITAL ENCOUNTER (OUTPATIENT)
Dept: OCCUPATIONAL THERAPY | Facility: HOSPITAL | Age: 26
Setting detail: THERAPIES SERIES
Discharge: HOME | End: 2021-12-29

## 2021-12-29 ENCOUNTER — HOSPITAL ENCOUNTER (OUTPATIENT)
Dept: PHYSICAL THERAPY | Facility: HOSPITAL | Age: 26
Setting detail: THERAPIES SERIES
Discharge: HOME | End: 2021-12-29

## 2021-12-29 DIAGNOSIS — G44.319 ACUTE POST-TRAUMATIC HEADACHE, NOT INTRACTABLE: ICD-10-CM

## 2021-12-29 DIAGNOSIS — M25.612 DECREASED ROM OF LEFT SHOULDER: ICD-10-CM

## 2021-12-29 DIAGNOSIS — R94.113 ABNORMAL RESULT OF EVALUATION OF OCULOMOTOR SYSTEM: ICD-10-CM
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
## 2021-12-29 DIAGNOSIS — Z78.9 IMPAIRED INSTRUMENTAL ACTIVITIES OF DAILY LIVING (IADL): ICD-10-CM

## 2021-12-29 DIAGNOSIS — Z74.09 IMPAIRED MOBILITY AND ADLS: ICD-10-CM

## 2021-12-29 DIAGNOSIS — S42.115D CLOSED NONDISPLACED FRACTURE OF BODY OF LEFT SCAPULA WITH ROUTINE HEALING, SUBSEQUENT ENCOUNTER: Primary | ICD-10-CM

## 2021-12-29 DIAGNOSIS — S06.0X0A CONCUSSION WITHOUT LOSS OF CONSCIOUSNESS, INITIAL ENCOUNTER: Primary | ICD-10-CM

## 2021-12-29 DIAGNOSIS — Z78.9 IMPAIRED MOBILITY AND ADLS: ICD-10-CM

## 2021-12-29 PROCEDURE — 97110 THERAPEUTIC EXERCISES: CPT

## 2021-12-29 PROCEDURE — 97530 THERAPEUTIC ACTIVITIES: CPT
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
## 2022-01-03 ENCOUNTER — APPOINTMENT (OUTPATIENT)
Dept: PHYSICAL THERAPY | Facility: HOSPITAL | Age: 27
End: 2022-01-03

## 2022-01-03 ENCOUNTER — APPOINTMENT (OUTPATIENT)
Dept: OCCUPATIONAL THERAPY | Facility: HOSPITAL | Age: 27
End: 2022-01-03

## 2022-01-05 ENCOUNTER — APPOINTMENT (OUTPATIENT)
Dept: PHYSICAL THERAPY | Facility: HOSPITAL | Age: 27
End: 2022-01-05

## 2022-01-05 ENCOUNTER — APPOINTMENT (OUTPATIENT)
Dept: OCCUPATIONAL THERAPY | Facility: HOSPITAL | Age: 27
End: 2022-01-05

## 2022-01-07 ENCOUNTER — HOSPITAL ENCOUNTER (OUTPATIENT)
Dept: PHYSICAL THERAPY | Facility: HOSPITAL | Age: 27
Setting detail: THERAPIES SERIES
Discharge: HOME | End: 2022-01-07

## 2022-01-07 DIAGNOSIS — S06.0X0A CONCUSSION WITHOUT LOSS OF CONSCIOUSNESS, INITIAL ENCOUNTER: Primary | ICD-10-CM

## 2022-01-07 DIAGNOSIS — R94.113 ABNORMAL RESULT OF EVALUATION OF OCULOMOTOR SYSTEM: ICD-10-CM

## 2022-01-07 DIAGNOSIS — G44.319 ACUTE POST-TRAUMATIC HEADACHE, NOT INTRACTABLE: ICD-10-CM

## 2022-01-07 PROCEDURE — 97530 THERAPEUTIC ACTIVITIES: CPT | Performed by: PHYSICAL THERAPIST
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
## 2022-01-10 ENCOUNTER — APPOINTMENT (OUTPATIENT)
Dept: PHYSICAL THERAPY | Facility: HOSPITAL | Age: 27
End: 2022-01-10

## 2022-01-14 ENCOUNTER — HOSPITAL ENCOUNTER (OUTPATIENT)
Dept: PHYSICAL THERAPY | Facility: HOSPITAL | Age: 27
Setting detail: THERAPIES SERIES
Discharge: HOME | End: 2022-01-14

## 2022-01-14 DIAGNOSIS — S06.0X0A CONCUSSION WITHOUT LOSS OF CONSCIOUSNESS, INITIAL ENCOUNTER: Primary | ICD-10-CM

## 2022-01-14 DIAGNOSIS — G44.319 ACUTE POST-TRAUMATIC HEADACHE, NOT INTRACTABLE: ICD-10-CM

## 2022-01-14 DIAGNOSIS — R94.113 ABNORMAL RESULT OF EVALUATION OF OCULOMOTOR SYSTEM: ICD-10-CM

## 2022-01-14 PROCEDURE — 97530 THERAPEUTIC ACTIVITIES: CPT | Performed by: PHYSICAL THERAPIST
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
## 2022-01-21 ENCOUNTER — APPOINTMENT (OUTPATIENT)
Dept: PHYSICAL THERAPY | Facility: HOSPITAL | Age: 27
End: 2022-01-21

## 2022-01-28 ENCOUNTER — HOSPITAL ENCOUNTER (OUTPATIENT)
Dept: PHYSICAL THERAPY | Facility: HOSPITAL | Age: 27
Setting detail: THERAPIES SERIES
Discharge: HOME | End: 2022-01-28

## 2022-01-28 DIAGNOSIS — R94.113 ABNORMAL RESULT OF EVALUATION OF OCULOMOTOR SYSTEM: ICD-10-CM

## 2022-01-28 DIAGNOSIS — G44.319 ACUTE POST-TRAUMATIC HEADACHE, NOT INTRACTABLE: ICD-10-CM

## 2022-01-28 DIAGNOSIS — S06.0X0A CONCUSSION WITHOUT LOSS OF CONSCIOUSNESS, INITIAL ENCOUNTER: Primary | ICD-10-CM

## 2022-01-28 PROCEDURE — 97530 THERAPEUTIC ACTIVITIES: CPT | Performed by: PHYSICAL THERAPIST
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
## 2022-02-02 ENCOUNTER — APPOINTMENT (OUTPATIENT)
Dept: PHYSICAL THERAPY | Facility: HOSPITAL | Age: 27
End: 2022-02-02

## 2022-02-04 ENCOUNTER — APPOINTMENT (OUTPATIENT)
Dept: PHYSICAL THERAPY | Facility: HOSPITAL | Age: 27
End: 2022-02-04

## 2022-02-11 ENCOUNTER — OFFICE VISIT (OUTPATIENT)
Dept: FAMILY MEDICINE CLINIC | Facility: CLINIC | Age: 27
End: 2022-02-11

## 2022-02-11 ENCOUNTER — APPOINTMENT (OUTPATIENT)
Dept: PHYSICAL THERAPY | Facility: HOSPITAL | Age: 27
End: 2022-02-11

## 2022-02-11 VITALS
OXYGEN SATURATION: 99 % | DIASTOLIC BLOOD PRESSURE: 70 MMHG | WEIGHT: 268.6 LBS | HEIGHT: 68 IN | TEMPERATURE: 98.2 F | SYSTOLIC BLOOD PRESSURE: 132 MMHG | HEART RATE: 81 BPM | BODY MASS INDEX: 40.71 KG/M2

## 2022-02-11 DIAGNOSIS — F43.10 PTSD (POST-TRAUMATIC STRESS DISORDER): Primary | ICD-10-CM

## 2022-02-11 PROCEDURE — 99213 OFFICE O/P EST LOW 20 MIN: CPT | Performed by: STUDENT IN AN ORGANIZED HEALTH CARE EDUCATION/TRAINING PROGRAM
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
## 2022-02-18 ENCOUNTER — HOSPITAL ENCOUNTER (OUTPATIENT)
Dept: PHYSICAL THERAPY | Facility: HOSPITAL | Age: 27
Setting detail: THERAPIES SERIES
Discharge: HOME | End: 2022-02-18

## 2022-02-18 DIAGNOSIS — G44.319 ACUTE POST-TRAUMATIC HEADACHE, NOT INTRACTABLE: ICD-10-CM

## 2022-02-18 DIAGNOSIS — S06.0X0A CONCUSSION WITHOUT LOSS OF CONSCIOUSNESS, INITIAL ENCOUNTER: Primary | ICD-10-CM

## 2022-02-18 DIAGNOSIS — R94.113 ABNORMAL RESULT OF EVALUATION OF OCULOMOTOR SYSTEM: ICD-10-CM

## 2022-02-18 PROCEDURE — 97530 THERAPEUTIC ACTIVITIES: CPT | Performed by: PHYSICAL THERAPIST
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
## 2022-02-25 ENCOUNTER — APPOINTMENT (OUTPATIENT)
Dept: PHYSICAL THERAPY | Facility: HOSPITAL | Age: 27
End: 2022-02-25

## 2022-03-04 ENCOUNTER — HOSPITAL ENCOUNTER (OUTPATIENT)
Dept: PHYSICAL THERAPY | Facility: HOSPITAL | Age: 27
Setting detail: THERAPIES SERIES
Discharge: HOME | End: 2022-03-04

## 2022-03-04 DIAGNOSIS — R94.113 ABNORMAL RESULT OF EVALUATION OF OCULOMOTOR SYSTEM: ICD-10-CM

## 2022-03-04 DIAGNOSIS — S06.0X0A CONCUSSION WITHOUT LOSS OF CONSCIOUSNESS, INITIAL ENCOUNTER: Primary | ICD-10-CM

## 2022-03-04 DIAGNOSIS — G44.319 ACUTE POST-TRAUMATIC HEADACHE, NOT INTRACTABLE: ICD-10-CM

## 2022-03-04 PROCEDURE — 97530 THERAPEUTIC ACTIVITIES: CPT | Performed by: PHYSICAL THERAPIST
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
## 2022-03-11 ENCOUNTER — APPOINTMENT (OUTPATIENT)
Dept: PHYSICAL THERAPY | Facility: HOSPITAL | Age: 27
End: 2022-03-11

## 2022-03-18 ENCOUNTER — HOSPITAL ENCOUNTER (OUTPATIENT)
Dept: PHYSICAL THERAPY | Facility: HOSPITAL | Age: 27
Setting detail: THERAPIES SERIES
Discharge: HOME | End: 2022-03-18

## 2022-03-18 DIAGNOSIS — R94.113 ABNORMAL RESULT OF EVALUATION OF OCULOMOTOR SYSTEM: ICD-10-CM

## 2022-03-18 DIAGNOSIS — S06.0X0A CONCUSSION WITHOUT LOSS OF CONSCIOUSNESS, INITIAL ENCOUNTER: Primary | ICD-10-CM

## 2022-03-18 DIAGNOSIS — G44.319 ACUTE POST-TRAUMATIC HEADACHE, NOT INTRACTABLE: ICD-10-CM

## 2022-03-18 PROCEDURE — 97530 THERAPEUTIC ACTIVITIES: CPT | Performed by: PHYSICAL THERAPIST

## 2022-03-18 NOTE — THERAPY DISCHARGE NOTE
"     Outpatient Physical Therapy Vestibular Progress Note/Discharge Summary  [facility]     Patient Name: Tanika Molina  : 1995  MRN: [id number]  Today's Date: 3/18/2022      Visit Date: 2022    Attendance:    Subjective Improvement: 90-95%  Next MD Appt: 3/29/22     Therapy Diagnosis: 1) Concussion; 2) Oculomotor dysfunction    Changes in Medications: none noted  Changes in MD Orders: none noted  Number of Work Days Lost: 2-2.5 months       Visit Dx:     ICD-10-CM ICD-9-CM   1. Concussion without loss of consciousness, initial encounter  S06.0X0A 850.0   2. Acute post-traumatic headache, not intractable  G44.319 339.21   3. Abnormal result of evaluation of oculomotor system  R94.113 794.14             Vestibular Eval     Row Name 22 1000             Subjective Comments    Subjective Comments \"I'm doing good. I'm staying tired from work.\" Concussion symptoms have \"improved a lot.\" Over the past week, she has noticed an improvement in her ability to see things in her peripheral vision. Her ability to concentrate and multi-task is improving as well. Scrolling is okay \"unless I get to going too fast.\" Able to readjust and focus later. Wearing glasses more. Glasses haven't really helped when driving, but is helping when reading. 90-95% subjective improvement. No medication changes. Returns to MD 3/29/22.  -SS              Vestibular Objective    General Observation No acute distress. Eyes appear symmetrical. Slight decrease in distance following object in peripheral vision to R compared to L.  -SS              Oculomotor Exam Fixation Present    Smooth Pursuit Normal  -SS      Saccades Intact  -SS            User Key  (r) = Recorded By, (t) = Taken By, (c) = Cosigned By    Initials Name Provider Type    SS Francois Stephens, PT DPT Physical Therapist                             PT Assessment/Plan     Row Name 22 1000          PT Assessment    Functional Limitations " "Limitation in home management;Limitations in community activities;Limitations in functional capacity and performance;Performance in work activities  -     Impairments Impaired cranial nerve integrity  -     Assessment Comments Significant improvement in vertigo and concussion symptoms. Independent with HEP/self-management.  -SS     Rehab Potential Good  -SS     Patient/caregiver participated in establishment of treatment plan and goals Yes  -SS     Patient would benefit from skilled therapy intervention No  -SS            PT Plan    PT Plan Comments D/C P.T. to HEP  -SS           User Key  (r) = Recorded By, (t) = Taken By, (c) = Cosigned By    Initials Name Provider Type    SS Francois Stephens, PT DPT Physical Therapist                      OP Exercises     Row Name 03/18/22 1000             Subjective Comments    Subjective Comments \"I'm doing good. I'm staying tired from work.\" Concussion symptoms have \"improved a lot.\" Over the past week, she has noticed an improvement in her ability to see things in her peripheral vision. Her ability to concentrate and multi-task is improving as well. Scrolling is okay \"unless I get to going too fast.\" Able to readjust and focus later. Wearing glasses more. Glasses haven't really helped when driving, but is helping when reading. 90-95% subjective improvement. No medication changes. Returns to MD 3/29/22.  -SS              Exercise 1    Exercise Name 1 recheck  -SS              Exercise 2    Exercise Name 2 EFX with arms  -      Cueing 2 Verbal  -SS      Time 2 10 mins  -SS      Additional Comments quickstart  -SS              Exercise 3    Exercise Name 3 Toss ball from hand to hand with visual tracking  -      Cueing 3 Verbal;Demo  -SS      Sets 3 1x without verbal distraction, 1 time with verbal distraction  -SS      Time 3 1 min  -SS      Additional Comments small black ball  -SS              Exercise 4    Exercise Name 4 Ball toss  -      Cueing 4 Verbal  " -SS      Time 4 3 mins  -SS              Exercise 5    Exercise Name 5 Optokinetic left to right  -SS      Cueing 5 Verbal  -SS      Time 5 1 min  -SS              Exercise 6    Exercise Name 6 Optokinetic right to left  -SS      Cueing 6 Verbal  -SS      Time 6 1 min  -SS            User Key  (r) = Recorded By, (t) = Taken By, (c) = Cosigned By    Initials Name Provider Type    Francois Stewart, PT DPT Physical Therapist                               PT OP Goals     Row Name 03/18/22 1000          PT Short Term Goals    STG Date to Achieve --  further STGs deferred  -SS            Long Term Goals    LTG Date to Achieve 03/18/22  -SS     LTG 1 Independent with HEP/self-management.  -SS     LTG 1 Progress Met  -SS     LTG 2 Return to work.  -SS     LTG 2 Progress Met  -SS     LTG 3 Normal oculomotor responses.  -SS     LTG 3 Progress Met  -SS     LTG 4 Resolution of post-concussion dizziness and headaches.  -SS     LTG 4 Progress Met  -SS     LTG 5 Resume PLOF.  -SS     LTG 5 Progress Met  -SS           User Key  (r) = Recorded By, (t) = Taken By, (c) = Cosigned By    Initials Name Provider Type    Francois Stewart, PT DPT Physical Therapist                Therapy Education  Given: HEP  Program: Reinforced  How Provided: Verbal  Provided to: Patient  Level of Understanding: Verbalized              Time Calculation:   Start Time: 1017  Stop Time: 1057  Time Calculation (min): 40 min     Therapy Charges for Today     Code Description Service Date Service Provider Modifiers Qty    [id number] HC PT THERAPEUTIC ACT EA 15 MIN 3/18/2022 Francois Stephens, PT DPT GP 3                OP PT Discharge Summary  Date of Discharge: 03/18/22  Reason for Discharge: All goals achieved  Outcomes Achieved: Able to achieve all goals within established timeline  Discharge Destination: Home with home program        Francois Stephens, PT, DPT, CHT  3/18/2022

## 2022-03-25 ENCOUNTER — APPOINTMENT (OUTPATIENT)
Dept: PHYSICAL THERAPY | Facility: HOSPITAL | Age: 27
End: 2022-03-25

## 2022-04-01 DIAGNOSIS — F43.10 PTSD (POST-TRAUMATIC STRESS DISORDER): ICD-10-CM

## 2022-06-26 ENCOUNTER — E-VISIT (OUTPATIENT)
Dept: FAMILY MEDICINE CLINIC | Facility: TELEHEALTH | Age: 27
End: 2022-06-26

## 2022-06-26 PROCEDURE — BRIGHTMDVISIT: Performed by: NURSE PRACTITIONER

## 2022-06-26 NOTE — EXTERNAL PATIENT INSTRUCTIONS
Diagnosis   Scabies   My name is Kristy Junior, and I'm a healthcare provider at [facility]. After reviewing your responses and photos, I see that you have scabies. Scabies is an extremely itchy skin condition caused by tiny insects called mites that have burrowed under the skin.   Medications   Your pharmacy   [facility] [phone] [place] [phone]     Prescription   Permethrin topical cream (5%): For scabies. Apply thin film once on the first day. After 7 to 14 days, apply again. Apply to all skin from neck to the soles of the feet, including areas under fingernails and toenails. If the scalp is involved, apply to scalp and face, avoiding the eyes and mouth. Leave on skin for 8 to 14 hours, then wash off. All household members should be treated at the same time.   About your diagnosis   Scabies is a highly contagious skin condition that makes your skin very itchy. It can spread easily through close physical contact. You mentioned being around someone with scabies or a rash that's similar to yours. This may explain your skin condition. Because it's so contagious, people who have had any exposure to scabies are strongly advised to get treatment.   Common signs and symptoms of scabies include:    A rash that's extremely itchy. The itchiness is often worse at night.    Tiny red blisters or bumps that appear in thin, linear tracks or burrows.   These burrows can appear anywhere on the body, but scabies are found most commonly:    In armpits, around the breasts, and around the waist    On shoulder blades or on the buttocks    On the inner elbow, along the insides of wrists, and between fingers    On or around the knees or feet   What to expect   Scabies is easily treated with medications applied to the skin. However, itching may continue for 2 to 4 weeks after the mites have been wiped out. Itching at nighttime should stop 1 week after effective treatment.   When to seek  care   Call us at [phone]   with any sudden or unexpected symptoms.    Signs of infection, including spreading redness, swelling, warmth, or pus drainage. Bacterial infections like impetigo can occur after scratching the affected area.    Your symptoms are severe, especially if they include crusting and scaling and cover large areas of your body.   Other treatment    Soak your skin in cool water    Apply calamine lotion to affected areas   Prevention    Think of people who've been in your house and people you've had close skin-to-skin contact with in the last 6 weeks. They should speak to their healthcare provider about getting scabies treatment.    Wash all your clothes, towels, and bedding in hot, soapy water. Dry on high heat. Dry-clean items you can't wash at home. Pesticides aren't necessary.    Place items that can't be washed in sealed plastic bags and stow away for at least 3 days. Mites don't survive for more than 2 to 3 days away from human skin.   Your provider   Your diagnosis was provided by Kristy Junior, a member of your trusted care team at [facility].   If you have any questions, call us at [phone]  .

## 2022-06-26 NOTE — E-VISIT TREATED
Chief Complaint: Rashes and other skin conditions   Patient introduction   Patient is 26-year-old female who reports pruritic, nonpainful bilateral rash on their abdomen, back, arms, right hand, and legs for 2-3 days.   Reports being bitten by an insect before the rash appeared.   Warning. The following may warrant further investigation:    History of prior MRSA infection   Patient did not request an excuse note.   General presentation   Denies fever. Denies additional systemic symptoms.   Reports trying diphenhydramine lotion, diphenhydramine pills, calamine lotion, moisturizing lotion, and OTC steroid cream for their current rash symptoms.   Helpful treatments:    Diphenhydramine pills   Not helpful:    Diphenhydramine lotion    Calamine lotion    Moisturizing lotion    OTC steroid cream   Eczema: Reports personal history of atopic conditions.   Chickenpox: Denies having had chickenpox. Reports 2 doses of varicella vaccine.   Scabies: Reports recent scabies exposure.   Impetigo: No known recent impetigo exposure.   Pruritus described as severe and is worsening. Itching makes daily activities difficult. Itching makes it difficult to sleep.   Patient reports rash is spreading or expanding.   Reports the following possible trigger(s):    Insect bite or sting   Patient denies the following red flags:    Systemic symptoms    Symptoms of anaphylactic reaction    Recent history suggesting adverse drug rash (no new medication, herb, or supplement)   Pregnancy/menstrual status/breastfeeding:   Denies being pregnant. Denies breastfeeding. Regarding last menstrual period, patient writes: 06/23/2022.   Current medications   Denies currently taking ACE inhibitors or ARBs.   Denies taking other medications or supplements.   Medication allergies    Sulfa medications   Medication contraindication review   Denies history of cerebral malaria, CHF, cutaneous cdjbx-nluhil-jnde disease, folate deficiency, G6PD deficiency (or  breastfeeding a child with G6PD deficiency), generalized erythroderma, liver disease, lamellar ichthyosis, malignancy or premalignancy at the affected site, megaloblastic anemia, mononucleosis, Netherton syndrome, porphyria, QT prolongation, congenital long QT syndrome, skin barrier defect condition, systemic mycoses, TMP/SMX-associated thrombocytopenia, thyroid dysfunction, and ulcerative colitis.   Denies history of myasthenia gravis, history of aortic aneurysm or dissection, hypertension, peripheral vascular disease, Marfan syndrome, and Majo-Danlos syndrome.   Past medical history   Immune conditions: Denies immunocompromising conditions. Denies history of cancer.   Assessment   Scabies.   This is the likely diagnosis based on supporting interview responses and patient-submitted photos, including:    Location of rash    Pruritic rash    Known exposure to scabies   Plan   Medications:    permethrin 5 % topical cream RX 5% apply thin film topically twice 14d for scabies. Apply thin film once on the first day. After 7 to 14 days, apply again. Apply to all skin from neck to the soles of the feet, including areas under fingernails and toenails. If the scalp is involved, apply to scalp and face, avoiding the eyes and mouth. Leave on skin for 8 to 14 hours, then wash off. All household members should be treated at the same time. Amount is 60 g.   The patient's prescription will be sent to:   [id number]   [facility] [employer] [place] [address]   Phone: [phone]     Fax: [phone]   Education:    Condition and causes    Prevention    Treatment and self-care    When to call provider   Additional Notes   Erythematous rash, diffuse, pruritic, possible burrowing in one photo   ----------   Electronically signed by ROMARIO Mayorga on 2022-06-26 at 06:04AM   ----------   Patient Interview Transcript:   Where is your rash located? Select all that apply.    Arm    Hand    Stomach    Back    Leg   Not  selected:    Scalp    Face    Inside mouth or on lips    Neck    Chest    Buttocks    Groin    Foot or in between toes    None of the above   Which arm is bothering you? Select one.    Both   Not selected:    Right    Left   Which hand is bothering you? Select one.    Right   Not selected:    Left    Both   Which leg is bothering you? Select one.    Both   Not selected:    Right    Left   Before you got the rash, were you exposed to anything that might have triggered it? Select one.    Yes   Not selected:    No, not that I know of   Did you have any of these exposures or possible triggers? Select all that apply.    Other insect bite or sting in the area of the rash   Not selected:    Tick bite    Cut, scrape, or other skin injury in the area of the rash    Contact with poison oak, poison ivy, or poison sumac in the area of the rash    Contact with a new soap, perfume, skincare product, cleaning product, or other chemical in the area of the rash    Wearing new jewelry, belt buckle, or other metal accessory in the area of the rash    Taking a new medication, supplement, or herb    Consuming a new food or drink    Vaccine injection    Exposure to extreme hot or cold temperatures    Exercising intensely    Sitting in a hot tub or swimming in a heated swimming pool    None of the above   Is the rash on one or both sides of your body? Select one.    Both sides   Not selected:    One side only   Have you had close contact with anyone who has scabies? Scabies is an itchy, highly contagious skin condition caused by a tiny mite. Select one.    Yes   Not selected:    No, not that I know of   Have you been around anyone with impetigo? Impetigo is a highly contagious skin infection. Select one.    No, not that I know of   Not selected:    Yes   Have you ever had chickenpox? Select one.    No   Not selected:    Yes    I'm not sure   Have you ever been vaccinated against chickenpox? Select one.    Yes, 2 doses of the vaccine   Not  selected:    Yes, 1 dose of the vaccine    No    I'm not sure   Since the rash first appeared, has it spread or shown up in new locations? This includes covering a larger area than it first did, or spreading to another part of the body. Select one.    Yes   Not selected:    No   Which of these describe your rash? Select all that apply.    Itchy   Not selected:    Painful    Warmer than other areas of my skin    None of the above   How intense is the itching? Select one.    Severe   Not selected:    Mild    Moderate    Unbearable   Has the itching gotten better, worse, or stayed the same? Select one.    Worse   Not selected:    Better    Same   Has the itching made daily activities difficult? Select one.    Yes   Not selected:    No   Has the itching made it difficult to sleep? Select one.    Yes   Not selected:    No   How long have you had these current rash symptoms? Select one.    2 to 3 days   Not selected:    Less than 24 hours    24 to 48 hours    3 to 5 days    5 to 7 days    1 to 2 weeks    More than 2 weeks   Ready to send photos? If you choose not to send photos, you may need to speak with a provider to get care.    OK, I'll send photos   Not selected:    No, I'd rather not send photos   Send at least 3 photos for review - Don't use a flash. - Make sure the photos are in focus. - Take a close-up photo (for size, shape, color). - Take a photo showing surrounding area (to compare with healthy skin). - Take a photo with a quarter coin or ruler near the rash to show scale. - If more than one location is affected, repeat for each location.    Upload 1    Upload 2    Upload 3    Upload 4   Not selected:    Upload 5   A rash can be a sign of a more serious condition. These conditions may need in-person care for an exam or lab tests. Along with the rash, have you had any of these symptoms? Select all that apply.    None of these   Not selected:    Fever    Body aches    Nausea    Vomiting    Diarrhea    Headache     Lethargy (feeling drowsy, dull, or low energy)   Along with the rash, have you had any of these symptoms? Select all that apply.    None of these   Not selected:    Chest pains or rapid heartbeat    Shortness of breath or wheezing    Swelling of lips or tongue    Throat tightness or hoarse voice    Stomach cramps, diarrhea, or vomiting    Confusion or dizziness   Do you or does anyone in your family have a history of allergies (hay fever, seasonal allergies), eczema, or asthma? Select all that apply.    Yes, I do   Not selected:    Yes, a family member does    No, not that I know of   Have you ever been treated for a MRSA (methicillin-resistant Staphylococcus aureus) infection? MRSA is a type of bacteria that's resistant to several antibiotics. Select one.    Yes   Not selected:    No, not that I know of   Do you have any of these conditions? Scroll to see all options. Select all that apply.    None of the above   Not selected:    QT prolongation    Congenital long QT syndrome    Mono (mononucleosis)    Systemic fungal infection, such as invasive candidiasis    Cerebral malaria    Porphyria    Skin cancer or pre-malignancy at the affected area    A serious skin condition (skin barrier defect condition, Netherton syndrome, lamellar ichthyosis, generalized erythroderma, or cutaneous dplsu-qaqeap-lhap disease)    Megaloblastic anemia    Infection at the affected area    Folate deficiency    G6PD deficiency, or breastfeeding a child with G6PD deficiency    Thyroid dysfunction    Ulcerative colitis   Do you have any of these conditions that can affect the immune system? Scroll to see all options. Select all that apply.    None of these   Not selected:    History of bone marrow transplant    Chronic kidney disease    Chronic liver disease (including cirrhosis)    HIV/AIDS    Inflammatory bowel disease (Crohn's disease or ulcerative colitis)    Lupus    Moderate to severe plaque psoriasis    Multiple sclerosis     Rheumatoid arthritis    Sickle cell anemia    Alpha or beta thalassemia    History of solid organ transplant (kidney, liver, or heart)    History of spleen removal    An autoimmune disorder not listed here    A condition requiring treatment with long-term use of oral steroids (such as prednisone, prednisolone, or dexamethasone)   Have you ever been diagnosed with cancer? Select one.    No   Not selected:    Yes, I have cancer now    Yes, but I'm in remission   Do you have any of these conditions? Scroll to see all options. Select all that apply.    None of the above   Not selected:    Myasthenia gravis    History of aortic aneurysm or dissection    Marfan syndrome    Majo-Danlos syndrome   Are you currently being treated for type 1 or type 2 diabetes? Select one.    No   Not selected:    Yes   Are you pregnant? Select one.    No   Not selected:    Yes   When was your last menstrual period? If you don't currently have periods or no longer have periods, please briefly explain.    06/23/2022   Are you breastfeeding? Select one.    No   Not selected:    Yes   Have you tried any of these for your current symptoms? Scroll to see all options. Select all that apply.    Diphenhydramine (Benadryl) lotion    Diphenhydramine (Benadryl) pills    Calamine lotion    A lotion or cream for dry skin    A non-prescription steroid cream (such as hydrocortisone)   Not selected:    Acetaminophen (Tylenol)    An allergy pill such as fexofenadine (Allegra), loratadine (Claritin), or cetirizine (Zyrtec)    Ibuprofen (Advil, Motrin)    A non-prescription antifungal cream    A non-prescription antifungal shampoo    Prednisone tablets    A prescription antifungal cream    A prescription antifungal shampoo    A prescription steroid cream    An oral antifungal medication    None of these   Which medications were helpful for your current symptoms? Scroll to see all options. Select all that apply.    Diphenhydramine (Benadryl) pills   Not  "selected:    Acetaminophen (Tylenol)    Diphenhydramine (Benadryl) lotion    An allergy pill such as fexofenadine (Allegra), loratadine (Claritin), or cetirizine (Zyrtec)    Calamine lotion    Ibuprofen (Advil, Motrin)    A lotion or cream for dry skin    A non-prescription antifungal cream    A non-prescription antifungal shampoo    A non-prescription steroid cream (such as hydrocortisone)    Prednisone tablets    A prescription antifungal cream    A prescription antifungal shampoo    A prescription steroid cream    An oral antifungal medication    None of the treatments were helpful   Are you taking any of these medications? Select all that apply.    No   Not selected:    An ACE inhibitor, such as lisinopril, enalapril, captopril, or benazepril    An angiotensin II receptor blocker (ARB), such as candesartan, irbesartan, losartan, or valsartan    Kynmobi or Apokyn (apomorphine)   Are you taking any other medications or supplements? On the next screen, you need to list all vitamins, supplements, non-prescription medications (such as aspirin or Aleve), and prescription medications that you're taking. Select one.    No   Not selected:    Yes    Yes, but I'm not sure what they are   Have you ever had an allergic or bad reaction to any medication? Select one.    Yes   Not selected:    No   Have you had an allergic or bad reaction to any of these antibiotic medications? Select all that apply.    Sulfamethoxazole, sulfisoxazole, sulfasalazine, or dapsone (Brands include Aczone, Bactrim and Septra)   Not selected:    Penicillin or any \"-cillin\" antibiotic, such as amoxicillin, ampicillin, dicloxacillin, nafcillin, or piperacillin (Brands include Augmentin, Unasyn, and Zosyn)    Tetracycline or any \"-cycline\" antibiotic, such as doxycycline, demeclocycline, minocycline (Brands include Declomycin, Doryx, Dynacin, Oracea, Monodox, and Vibramycin)    Ciprofloxacin or any \"-floxacin\" antibiotic, such as gemifloxacin, " "levofloxacin, moxifloxacin, or ofloxacin (Brands include Factive, Cipro, Floxin, and Levaquin)    Cephalexin or any \"cef-\" antibiotic, such as cefazolin, cefdinir, cefuroxime, ceftriaxone, ceftazidime, or cefepime (Brands include Ancef, Ceftin, Fortaz, Keflex, Maxipime, Rocephin, and Simplicef)    Clindamycin or lincomycin (Brands include Cleocin and Lincocin)    No, not that I know of   Have you had an allergic or bad reaction to any of these medications? Select all that apply.    No, not that I know of   Not selected:    Antifungals, such as clotrimazole, fluconazole, ketoconazole, miconazole, or itraconazole, (Diflucan, Extina, Lotrimin-AF, Nizoral, Micatin, Onmel, Zeasorb)    Antiparasitics, such as Permethrin, or lindane (Elimite, Nix, Kwell)    Antivirals, such as acyclovir, penciclovir, or valacyclovir (Valtrex, Zovirax)    Griseofulvin (Abbi-PEG)    Terbinafine (Lamisil)   Have you had an allergic or bad reaction to any corticosteroids? - Examples of topical corticosteroids include: hydrocortisone (Cortizone), clobetasol (Temovate, Cormax), betamethasone (Diprolene), fluocinonide (Vanos), desonide (Desowen, Desonate, Tridesilon), triamcinolone (Kenalog, Trianex), alclometasone, and mometasone (Elocon). - Examples of oral corticosteroids include: prednisone and methylprednisolone (Medrol). Select one.    No, not that I know of   Not selected:    Yes   Have you had an allergic or bad reaction to any of these topical medications? A topical medication is a cream, gel, or ointment that's put on the skin. Select all that apply.    No, not that I know of   Not selected:    Mupirocin (Bactroban)    Topical retinoids, such as adapalene, azelaic acid, tazarotene, or tretinoin (Azelex, Differin, Finacea, or Tazorac)    Pimecrolimus or tacrolimus (Elidel or Protopic)    Crisaborole (Eucrisa)   Have you had an allergic or bad reaction to any of these medications? Select all that apply.    No, not that I know of   Not " selected:    Acetaminophen (Tylenol)    Ibuprofen (Advil, Motrin, Midol)    Diphenhydramine (Benadryl)    Cetirizine (Zyrtec)    Hydroxyzine pamoate (Vistaril)    Loratadine (Alavert, Claritin)    Fexofenadine (Allegra)    Ammonium lactate lotion (Amlactin, Lac-Hydrin, Laclotion, Ultravate)    Salicylic acid cream (Salacyn, Salex)    Urea cream (Aqua Care, Nutraplus)    Calcium acetate/aluminum sulfate (Domeboro)   Have you had an allergic or bad reaction to ondansetron (Zuplenz, Zofran ODT, Zofran)? Select one.    No, not that I know of   Not selected:    Yes   Do you need a doctor's note? A doctor's note confirms that you received care today and states when you can return to school or work. It does not contain information about your diagnosis or treatment plan. Your provider will make the final decision on whether to give you a doctor's note. Doctor's notes cannot be backdated. Select one.    No   Not selected:    Today only (1 day)    Today and tomorrow (2 days)    3 days   Is there anything else you'd like to tell us about your symptoms?   The patient did not enter any additional information.   ----------   Medical history   Medical history data does not currently exist for this patient.

## 2022-08-08 ENCOUNTER — TRANSCRIBE ORDERS (OUTPATIENT)
Dept: GENERAL RADIOLOGY | Facility: CLINIC | Age: 27
End: 2022-08-08

## 2022-08-08 DIAGNOSIS — M25.512 LEFT SHOULDER PAIN, UNSPECIFIED CHRONICITY: Primary | ICD-10-CM

## 2023-04-16 ENCOUNTER — HOSPITAL ENCOUNTER (EMERGENCY)
Facility: HOSPITAL | Age: 28
Discharge: HOME | End: 2023-04-16
Attending: EMERGENCY MEDICINE | Admitting: EMERGENCY MEDICINE
Payer: COMMERCIAL

## 2023-04-16 ENCOUNTER — APPOINTMENT (OUTPATIENT)
Dept: GENERAL RADIOLOGY | Facility: HOSPITAL | Age: 28
End: 2023-04-16
Payer: COMMERCIAL

## 2023-04-16 VITALS
HEIGHT: 68 IN | BODY MASS INDEX: 31.63 KG/M2 | WEIGHT: 208.7 LBS | RESPIRATION RATE: 18 BRPM | DIASTOLIC BLOOD PRESSURE: 73 MMHG | TEMPERATURE: 97.5 F | HEART RATE: 120 BPM | OXYGEN SATURATION: 98 % | SYSTOLIC BLOOD PRESSURE: 119 MMHG

## 2023-04-16 DIAGNOSIS — R11.2 NAUSEA AND VOMITING, UNSPECIFIED VOMITING TYPE: Primary | ICD-10-CM

## 2023-04-16 DIAGNOSIS — Z90.3 S/P GASTRIC SLEEVE PROCEDURE: ICD-10-CM

## 2023-04-16 LAB
ALBUMIN SERPL-MCNC: 4.4 G/DL (ref 3.5–5.2)
ALBUMIN/GLOB SERPL: 1.2 G/DL
ALP SERPL-CCNC: 75 U/L (ref 39–117)
ALT SERPL W P-5'-P-CCNC: 36 U/L (ref 1–33)
ANION GAP SERPL CALCULATED.3IONS-SCNC: 16 MMOL/L (ref 5–15)
AST SERPL-CCNC: 31 U/L (ref 1–32)
B-HCG UR QL: NEGATIVE
BACTERIA UR QL AUTO: ABNORMAL /HPF
BASOPHILS # BLD AUTO: 0.07 10*3/MM3 (ref 0–0.2)
BASOPHILS NFR BLD AUTO: 0.7 % (ref 0–1.5)
BILIRUB SERPL-MCNC: 0.7 MG/DL (ref 0–1.2)
BILIRUB UR QL STRIP: NEGATIVE
BUN SERPL-MCNC: 8 MG/DL (ref 6–20)
BUN/CREAT SERPL: 12.1 (ref 7–25)
CALCIUM SPEC-SCNC: 10.2 MG/DL (ref 8.6–10.5)
CHLORIDE SERPL-SCNC: 93 MMOL/L (ref 98–107)
CLARITY UR: ABNORMAL
CO2 SERPL-SCNC: 28 MMOL/L (ref 22–29)
COLOR UR: YELLOW
CREAT SERPL-MCNC: 0.66 MG/DL (ref 0.57–1)
D-LACTATE SERPL-SCNC: 2 MMOL/L (ref 0.5–2)
DEPRECATED RDW RBC AUTO: 42.6 FL (ref 37–54)
EGFRCR SERPLBLD CKD-EPI 2021: 124.2 ML/MIN/1.73
EOSINOPHIL # BLD AUTO: 0.28 10*3/MM3 (ref 0–0.4)
EOSINOPHIL NFR BLD AUTO: 3 % (ref 0.3–6.2)
ERYTHROCYTE [DISTWIDTH] IN BLOOD BY AUTOMATED COUNT: 13.4 % (ref 12.3–15.4)
GLOBULIN UR ELPH-MCNC: 3.7 GM/DL
GLUCOSE BLDC GLUCOMTR-MCNC: 119 MG/DL (ref 70–130)
GLUCOSE SERPL-MCNC: 104 MG/DL (ref 65–99)
GLUCOSE UR STRIP-MCNC: NEGATIVE MG/DL
HCT VFR BLD AUTO: 46.2 % (ref 34–46.6)
HGB BLD-MCNC: 15.2 G/DL (ref 12–15.9)
HGB UR QL STRIP.AUTO: ABNORMAL
HOLD SPECIMEN: NORMAL
HOLD SPECIMEN: NORMAL
HYALINE CASTS UR QL AUTO: ABNORMAL /LPF
IMM GRANULOCYTES # BLD AUTO: 0.02 10*3/MM3 (ref 0–0.05)
IMM GRANULOCYTES NFR BLD AUTO: 0.2 % (ref 0–0.5)
KETONES UR QL STRIP: ABNORMAL
LEUKOCYTE ESTERASE UR QL STRIP.AUTO: ABNORMAL
LIPASE SERPL-CCNC: 93 U/L (ref 13–60)
LYMPHOCYTES # BLD AUTO: 1.5 10*3/MM3 (ref 0.7–3.1)
LYMPHOCYTES NFR BLD AUTO: 15.8 % (ref 19.6–45.3)
MAGNESIUM SERPL-MCNC: 2.1 MG/DL (ref 1.6–2.6)
MCH RBC QN AUTO: 28.9 PG (ref 26.6–33)
MCHC RBC AUTO-ENTMCNC: 32.9 G/DL (ref 31.5–35.7)
MCV RBC AUTO: 87.8 FL (ref 79–97)
MONOCYTES # BLD AUTO: 0.74 10*3/MM3 (ref 0.1–0.9)
MONOCYTES NFR BLD AUTO: 7.8 % (ref 5–12)
NEUTROPHILS NFR BLD AUTO: 6.86 10*3/MM3 (ref 1.7–7)
NEUTROPHILS NFR BLD AUTO: 72.5 % (ref 42.7–76)
NITRITE UR QL STRIP: NEGATIVE
NRBC BLD AUTO-RTO: 0 /100 WBC (ref 0–0.2)
PH UR STRIP.AUTO: 6 [PH] (ref 5–9)
PLATELET # BLD AUTO: 205 10*3/MM3 (ref 140–450)
PMV BLD AUTO: 11.7 FL (ref 6–12)
POTASSIUM SERPL-SCNC: 3.3 MMOL/L (ref 3.5–5.2)
PROT SERPL-MCNC: 8.1 G/DL (ref 6–8.5)
PROT UR QL STRIP: ABNORMAL
RBC # BLD AUTO: 5.26 10*6/MM3 (ref 3.77–5.28)
RBC # UR STRIP: ABNORMAL /HPF
REF LAB TEST METHOD: ABNORMAL
SODIUM SERPL-SCNC: 137 MMOL/L (ref 136–145)
SP GR UR STRIP: 1.01 (ref 1–1.03)
SQUAMOUS #/AREA URNS HPF: ABNORMAL /HPF
UROBILINOGEN UR QL STRIP: ABNORMAL
WBC # UR STRIP: ABNORMAL /HPF
WBC NRBC COR # BLD: 9.47 10*3/MM3 (ref 3.4–10.8)
WHOLE BLOOD HOLD COAG: NORMAL

## 2023-04-16 PROCEDURE — 80053 COMPREHEN METABOLIC PANEL: CPT | Performed by: NURSE PRACTITIONER

## 2023-04-16 PROCEDURE — 81025 URINE PREGNANCY TEST: CPT | Performed by: NURSE PRACTITIONER

## 2023-04-16 PROCEDURE — 93005 ELECTROCARDIOGRAM TRACING: CPT

## 2023-04-16 PROCEDURE — 74022 RADEX COMPL AQT ABD SERIES: CPT

## 2023-04-16 PROCEDURE — 81001 URINALYSIS AUTO W/SCOPE: CPT | Performed by: NURSE PRACTITIONER

## 2023-04-16 PROCEDURE — 85025 COMPLETE CBC W/AUTO DIFF WBC: CPT | Performed by: NURSE PRACTITIONER

## 2023-04-16 PROCEDURE — 83690 ASSAY OF LIPASE: CPT | Performed by: NURSE PRACTITIONER

## 2023-04-16 PROCEDURE — 82962 GLUCOSE BLOOD TEST: CPT

## 2023-04-16 PROCEDURE — 99284 EMERGENCY DEPT VISIT MOD MDM: CPT

## 2023-04-16 PROCEDURE — 83735 ASSAY OF MAGNESIUM: CPT | Performed by: NURSE PRACTITIONER

## 2023-04-16 PROCEDURE — 83605 ASSAY OF LACTIC ACID: CPT | Performed by: NURSE PRACTITIONER

## 2023-04-16 PROCEDURE — 96360 HYDRATION IV INFUSION INIT: CPT

## 2023-04-16 RX ORDER — PROMETHAZINE HYDROCHLORIDE 25 MG/1
25 TABLET ORAL EVERY 6 HOURS PRN
Qty: 20 TABLET | Refills: 0 | Status: SHIPPED | OUTPATIENT
Start: 2023-04-16

## 2023-04-16 RX ORDER — SODIUM CHLORIDE 0.9 % (FLUSH) 0.9 %
10 SYRINGE (ML) INJECTION AS NEEDED
Status: DISCONTINUED | OUTPATIENT
Start: 2023-04-16 | End: 2023-04-16 | Stop reason: HOSPADM

## 2023-04-16 RX ORDER — ONDANSETRON 4 MG/1
4 TABLET, ORALLY DISINTEGRATING ORAL EVERY 8 HOURS PRN
Qty: 9 TABLET | Refills: 0 | Status: SHIPPED | OUTPATIENT
Start: 2023-04-16 | End: 2023-04-19

## 2023-04-16 RX ADMIN — SODIUM CHLORIDE 1000 ML: 9 INJECTION, SOLUTION INTRAVENOUS at 17:29

## 2023-04-16 RX ADMIN — SODIUM CHLORIDE 1000 ML: 9 INJECTION, SOLUTION INTRAVENOUS at 19:28

## 2023-04-17 LAB
QT INTERVAL: 378 MS
QTC INTERVAL: 487 MS

## 2023-04-17 NOTE — ED PROVIDER NOTES
Subjective   History of Present Illness  Patient emergency department complaining of generalized weakness malaise nausea vomiting diarrhea.  She is approximately 2-week status post gastric sleeve performed in [place].  She reports over the past week she has had no appetite, concern for dumping syndrome everything she eats goes through her.  Denies any fevers any abdominal pain.    History provided by:  Patient   used: No        Review of Systems   Constitutional: Positive for fatigue. Negative for fever.   HENT: Negative for congestion.    Respiratory: Negative for shortness of breath.    Cardiovascular: Negative for chest pain and palpitations.   Gastrointestinal: Positive for diarrhea, nausea and vomiting.   Genitourinary: Negative for flank pain.   Musculoskeletal: Negative for joint swelling.   Skin: Positive for wound (surgical ).   Neurological: Positive for weakness.   Hematological: Negative for adenopathy.   Psychiatric/Behavioral: Negative for confusion.   All other systems reviewed and are negative.      Past Medical History:   Diagnosis Date   • Anemia    • Asthma    • Closed left scapular fracture    • Concussion    • Multiple rib fractures     left       Allergies   Allergen Reactions   • Erythromycin Hives   • Other Hives     Pediazole   • Sulfa Antibiotics Hives   • Vantin [Cefpodoxime]        Past Surgical History:   Procedure Laterality Date   • ADENOIDECTOMY     • APPENDECTOMY     • CHOLECYSTECTOMY     • TONSILLECTOMY         Family History   Problem Relation Age of Onset   • No Known Problems Mother    • No Known Problems Father    • No Known Problems Sister    • No Known Problems Brother    • No Known Problems Daughter    • No Known Problems Son    • No Known Problems Maternal Aunt    • No Known Problems Maternal Uncle    • No Known Problems Paternal Aunt    • No Known Problems Paternal Uncle    • No Known Problems Maternal Grandmother    • No Known Problems Maternal  Grandfather    • No Known Problems Paternal Grandmother    • No Known Problems Paternal Grandfather        Social History     Socioeconomic History   • Marital status: Legally    Tobacco Use   • Smoking status: Never   • Smokeless tobacco: Never   Vaping Use   • Vaping Use: Never used   Substance and Sexual Activity   • Alcohol use: Yes     Comment: socially   • Drug use: No   • Sexual activity: Defer           Objective   Physical Exam  Vitals and nursing note reviewed.   Constitutional:       Appearance: She is well-developed.   HENT:      Head: Normocephalic.      Nose: Nose normal.   Eyes:      Conjunctiva/sclera: Conjunctivae normal.      Pupils: Pupils are equal, round, and reactive to light.   Cardiovascular:      Rate and Rhythm: Normal rate and regular rhythm.      Heart sounds: Normal heart sounds.   Pulmonary:      Effort: Pulmonary effort is normal.      Breath sounds: Normal breath sounds.   Abdominal:      Palpations: Abdomen is soft.      Comments: Lap incisions with good approximation, no swelling erythema or drainage    Musculoskeletal:         General: Normal range of motion.      Cervical back: Normal range of motion.   Skin:     General: Skin is warm and dry.   Neurological:      Mental Status: She is alert and oriented to person, place, and time.      GCS: GCS eye subscore is 4. GCS verbal subscore is 5. GCS motor subscore is 6.         ECG 12 Lead      Date/Time: 4/16/2023 7:30 PM  Performed by: Олег Castro APRN  Authorized by: Júnior Simth MD   Interpreted by physician  Comparison: not compared with previous ECG   Rhythm: sinus rhythm  Rate: normal  QRS axis: normal  Conduction: conduction normal  ST Segments: ST segments normal  T Waves: T waves normal  Other findings: LAE  Clinical impression: normal ECG                 ED Course            Results for orders placed or performed during the hospital encounter of 04/16/23   Comprehensive Metabolic Panel    Specimen: Blood    Result Value Ref Range    Glucose 104 (H) 65 - 99 mg/dL    BUN 8 6 - 20 mg/dL    Creatinine 0.66 0.57 - 1.00 mg/dL    Sodium 137 136 - 145 mmol/L    Potassium 3.3 (L) 3.5 - 5.2 mmol/L    Chloride 93 (L) 98 - 107 mmol/L    CO2 28.0 22.0 - 29.0 mmol/L    Calcium 10.2 8.6 - 10.5 mg/dL    Total Protein 8.1 6.0 - 8.5 g/dL    Albumin 4.4 3.5 - 5.2 g/dL    ALT (SGPT) 36 (H) 1 - 33 U/L    AST (SGOT) 31 1 - 32 U/L    Alkaline Phosphatase 75 39 - 117 U/L    Total Bilirubin 0.7 0.0 - 1.2 mg/dL    Globulin 3.7 gm/dL    A/G Ratio 1.2 g/dL    BUN/Creatinine Ratio 12.1 7.0 - 25.0    Anion Gap 16.0 (H) 5.0 - 15.0 mmol/L    eGFR 124.2 >60.0 mL/min/1.73   Urinalysis With Microscopic If Indicated (No Culture) - Urine, Clean Catch    Specimen: Urine, Clean Catch   Result Value Ref Range    Color, UA Yellow Yellow, Straw, Dark Yellow, [place]    Appearance, UA Turbid (A) Clear    pH, UA 6.0 5.0 - 9.0    Specific [place], UA 1.015 1.003 - 1.030    Glucose, UA Negative Negative    Ketones, UA 15 mg/dL (1+) (A) Negative    Bilirubin, UA Negative Negative    Blood, UA Moderate (2+) (A) Negative    Protein, UA Trace (A) Negative    Leuk Esterase, UA Moderate (2+) (A) Negative    Nitrite, UA Negative Negative    Urobilinogen, UA 1.0 E.U./dL 0.2 - 1.0 E.U./dL   CBC Auto Differential    Specimen: Blood   Result Value Ref Range    WBC 9.47 3.40 - 10.80 10*3/mm3    RBC 5.26 3.77 - 5.28 10*6/mm3    Hemoglobin 15.2 12.0 - 15.9 g/dL    Hematocrit 46.2 34.0 - 46.6 %    MCV 87.8 79.0 - 97.0 fL    MCH 28.9 26.6 - 33.0 pg    MCHC 32.9 31.5 - 35.7 g/dL    RDW 13.4 12.3 - 15.4 %    RDW-SD 42.6 37.0 - 54.0 fl    MPV 11.7 6.0 - 12.0 fL    Platelets 205 140 - 450 10*3/mm3    Neutrophil % 72.5 42.7 - 76.0 %    Lymphocyte % 15.8 (L) 19.6 - 45.3 %    Monocyte % 7.8 5.0 - 12.0 %    Eosinophil % 3.0 0.3 - 6.2 %    Basophil % 0.7 0.0 - 1.5 %    Immature Grans % 0.2 0.0 - 0.5 %    Neutrophils, Absolute 6.86 1.70 - 7.00 10*3/mm3    Lymphocytes, Absolute 1.50 0.70 -  3.10 10*3/mm3    Monocytes, Absolute 0.74 0.10 - 0.90 10*3/mm3    Eosinophils, Absolute 0.28 0.00 - 0.40 10*3/mm3    Basophils, Absolute 0.07 0.00 - 0.20 10*3/mm3    Immature Grans, Absolute 0.02 0.00 - 0.05 10*3/mm3    nRBC 0.0 0.0 - 0.2 /100 WBC   Lactic Acid, Plasma    Specimen: Blood   Result Value Ref Range    Lactate 2.0 0.5 - 2.0 mmol/L   Lipase    Specimen: Blood   Result Value Ref Range    Lipase 93 (H) 13 - 60 U/L   Magnesium    Specimen: Blood   Result Value Ref Range    Magnesium 2.1 1.6 - 2.6 mg/dL   Pregnancy, Urine - Urine, Clean Catch    Specimen: Urine, Clean Catch   Result Value Ref Range    HCG, Urine QL Negative Negative   Urinalysis, Microscopic Only - Urine, Clean Catch    Specimen: Urine, Clean Catch   Result Value Ref Range    RBC, UA 3-5 (A) None Seen /HPF    WBC, UA 21-30 (A) None Seen, 0-2, 3-5 /HPF    Bacteria, UA 2+ (A) None Seen /HPF    Squamous Epithelial Cells, UA 13-20 (A) None Seen, 0-2 /HPF    Hyaline Casts, UA 3-6 None Seen /LPF    Methodology Manual Light Microscopy    POC Glucose Once    Specimen: Blood   Result Value Ref Range    Glucose 119 70 - 130 mg/dL   ECG 12 Lead Tachycardia   Result Value Ref Range    QT Interval 378 ms    QTC Interval 487 ms   Gold Top - SST   Result Value Ref Range    Extra Tube Hold for add-ons.    Light Blue Top   Result Value Ref Range    Extra Tube Hold for add-ons.      XR Abdomen 2+ VW with Chest 1 VW   Final Result      1. No radiographic evidence of acute cardiopulmonary or abdominopelvic process.                                                    Medical Decision Making  Patient office today 2 weeks status post gastric sleeve.  See HPI for details.  Concerning etiologies include a bowel obstruction versus surgical complications versus infection.  Her abdominal laparoscopic wounds do not show any signs of infection.  Good approximation no erythema or drainage.  Plain film negative for any type of bowel obstruction or postop complications.  May  be having some type of dumping syndrome postop.  Labs and images discussed with patient in detail.  Lactic acid within normal limits, she does have some ketones in her urine and a small gap slight dehydration.  IV rehydration in the emergency room.  Patient agreeable and stable for discharge.    Nausea and vomiting, unspecified vomiting type: complicated acute illness or injury  S/P gastric sleeve procedure: complicated acute illness or injury  Amount and/or Complexity of Data Reviewed  Labs: ordered.  Radiology: ordered.  ECG/medicine tests: ordered and independent interpretation performed.      Risk  Prescription drug management.          Final diagnoses:   S/P gastric sleeve procedure   Nausea and vomiting, unspecified vomiting type       ED Disposition  ED Disposition     ED Disposition   Discharge    Condition   Stable    Comment   --             Jena Mcclellan MD  [address] [facility] [address]  [place] [id number] [phone]    Call in 1 week           Medication List      New Prescriptions    promethazine 25 MG tablet  Commonly known as: PHENERGAN  Take 1 tablet by mouth Every 6 (Six) Hours As Needed for Nausea or Vomiting.        Changed    ondansetron ODT 4 MG disintegrating tablet  Commonly known as: ZOFRAN-ODT  Place 1 tablet under the tongue Every 8 (Eight) Hours As Needed for Nausea or Vomiting for up to 3 days.  What changed:   · how to take this  · when to take this        Stop    ondansetron 4 MG tablet  Commonly known as: ZOFRAN           Where to Get Your Medications      These medications were sent to [id number] - [place], [place] - [address] [facility] [facility] [place] [facility] [address] [address] - [phone]  - [phone]   [address] [facility] [address]    Phone: [phone]   · ondansetron ODT 4 MG disintegrating tablet  · promethazine 25 MG tablet          Олег Castro, APRN  04/16/23 1944       Олег Castro, APRN  04/16/23 2010

## 2023-04-21 ENCOUNTER — APPOINTMENT (OUTPATIENT)
Dept: CT IMAGING | Facility: HOSPITAL | Age: 28
End: 2023-04-21
Payer: COMMERCIAL

## 2023-04-21 ENCOUNTER — HOSPITAL ENCOUNTER (EMERGENCY)
Facility: HOSPITAL | Age: 28
Discharge: HOME | End: 2023-04-21
Attending: FAMILY MEDICINE
Payer: COMMERCIAL

## 2023-04-21 VITALS
WEIGHT: 208.5 LBS | SYSTOLIC BLOOD PRESSURE: 135 MMHG | DIASTOLIC BLOOD PRESSURE: 84 MMHG | HEIGHT: 68 IN | HEART RATE: 101 BPM | RESPIRATION RATE: 18 BRPM | TEMPERATURE: 97.5 F | BODY MASS INDEX: 31.6 KG/M2 | OXYGEN SATURATION: 99 %

## 2023-04-21 DIAGNOSIS — R10.84 GENERALIZED ABDOMINAL PAIN: Primary | ICD-10-CM

## 2023-04-21 LAB
ALBUMIN SERPL-MCNC: 4.3 G/DL (ref 3.5–5.2)
ALBUMIN/GLOB SERPL: 1.2 G/DL
ALP SERPL-CCNC: 74 U/L (ref 39–117)
ALT SERPL W P-5'-P-CCNC: 76 U/L (ref 1–33)
ANION GAP SERPL CALCULATED.3IONS-SCNC: 19 MMOL/L (ref 5–15)
AST SERPL-CCNC: 52 U/L (ref 1–32)
B-HCG UR QL: NEGATIVE
BACTERIA UR QL AUTO: ABNORMAL /HPF
BASOPHILS # BLD AUTO: 0.05 10*3/MM3 (ref 0–0.2)
BASOPHILS NFR BLD AUTO: 0.6 % (ref 0–1.5)
BILIRUB SERPL-MCNC: 0.8 MG/DL (ref 0–1.2)
BILIRUB UR QL STRIP: NEGATIVE
BUN SERPL-MCNC: 6 MG/DL (ref 6–20)
BUN/CREAT SERPL: 9.2 (ref 7–25)
CALCIUM SPEC-SCNC: 9.5 MG/DL (ref 8.6–10.5)
CHLORIDE SERPL-SCNC: 95 MMOL/L (ref 98–107)
CLARITY UR: ABNORMAL
CO2 SERPL-SCNC: 23 MMOL/L (ref 22–29)
COLOR UR: YELLOW
CREAT SERPL-MCNC: 0.65 MG/DL (ref 0.57–1)
DEPRECATED RDW RBC AUTO: 42.9 FL (ref 37–54)
EGFRCR SERPLBLD CKD-EPI 2021: 124.7 ML/MIN/1.73
EOSINOPHIL # BLD AUTO: 0.05 10*3/MM3 (ref 0–0.4)
EOSINOPHIL NFR BLD AUTO: 0.6 % (ref 0.3–6.2)
ERYTHROCYTE [DISTWIDTH] IN BLOOD BY AUTOMATED COUNT: 13.7 % (ref 12.3–15.4)
GLOBULIN UR ELPH-MCNC: 3.5 GM/DL
GLUCOSE SERPL-MCNC: 97 MG/DL (ref 65–99)
GLUCOSE UR STRIP-MCNC: NEGATIVE MG/DL
HCT VFR BLD AUTO: 41.9 % (ref 34–46.6)
HGB BLD-MCNC: 14.1 G/DL (ref 12–15.9)
HGB UR QL STRIP.AUTO: ABNORMAL
HOLD SPECIMEN: NORMAL
HYALINE CASTS UR QL AUTO: ABNORMAL /LPF
IMM GRANULOCYTES # BLD AUTO: 0.02 10*3/MM3 (ref 0–0.05)
IMM GRANULOCYTES NFR BLD AUTO: 0.2 % (ref 0–0.5)
KETONES UR QL STRIP: ABNORMAL
LEUKOCYTE ESTERASE UR QL STRIP.AUTO: NEGATIVE
LIPASE SERPL-CCNC: 73 U/L (ref 13–60)
LYMPHOCYTES # BLD AUTO: 1.28 10*3/MM3 (ref 0.7–3.1)
LYMPHOCYTES NFR BLD AUTO: 14.7 % (ref 19.6–45.3)
MCH RBC QN AUTO: 29.2 PG (ref 26.6–33)
MCHC RBC AUTO-ENTMCNC: 33.7 G/DL (ref 31.5–35.7)
MCV RBC AUTO: 86.7 FL (ref 79–97)
MONOCYTES # BLD AUTO: 0.5 10*3/MM3 (ref 0.1–0.9)
MONOCYTES NFR BLD AUTO: 5.7 % (ref 5–12)
NEUTROPHILS NFR BLD AUTO: 6.8 10*3/MM3 (ref 1.7–7)
NEUTROPHILS NFR BLD AUTO: 78.2 % (ref 42.7–76)
NITRITE UR QL STRIP: NEGATIVE
NRBC BLD AUTO-RTO: 0 /100 WBC (ref 0–0.2)
PH UR STRIP.AUTO: 5.5 [PH] (ref 5–9)
PLATELET # BLD AUTO: 196 10*3/MM3 (ref 140–450)
PMV BLD AUTO: 11.5 FL (ref 6–12)
POTASSIUM SERPL-SCNC: 3.4 MMOL/L (ref 3.5–5.2)
PROT SERPL-MCNC: 7.8 G/DL (ref 6–8.5)
PROT UR QL STRIP: ABNORMAL
RBC # BLD AUTO: 4.83 10*6/MM3 (ref 3.77–5.28)
RBC # UR STRIP: ABNORMAL /HPF
REF LAB TEST METHOD: ABNORMAL
SODIUM SERPL-SCNC: 137 MMOL/L (ref 136–145)
SP GR UR STRIP: 1.03 (ref 1–1.03)
SQUAMOUS #/AREA URNS HPF: ABNORMAL /HPF
UROBILINOGEN UR QL STRIP: ABNORMAL
WBC # UR STRIP: ABNORMAL /HPF
WBC NRBC COR # BLD: 8.7 10*3/MM3 (ref 3.4–10.8)
WHOLE BLOOD HOLD COAG: NORMAL
WHOLE BLOOD HOLD SPECIMEN: NORMAL

## 2023-04-21 PROCEDURE — 25010000002 ONDANSETRON PER 1 MG: Performed by: FAMILY MEDICINE

## 2023-04-21 PROCEDURE — 25010000002 PROCHLORPERAZINE 10 MG/2ML SOLUTION: Performed by: FAMILY MEDICINE

## 2023-04-21 PROCEDURE — 74177 CT ABD & PELVIS W/CONTRAST: CPT

## 2023-04-21 PROCEDURE — 85025 COMPLETE CBC W/AUTO DIFF WBC: CPT

## 2023-04-21 PROCEDURE — 80053 COMPREHEN METABOLIC PANEL: CPT

## 2023-04-21 PROCEDURE — 96376 TX/PRO/DX INJ SAME DRUG ADON: CPT

## 2023-04-21 PROCEDURE — 96375 TX/PRO/DX INJ NEW DRUG ADDON: CPT

## 2023-04-21 PROCEDURE — 96374 THER/PROPH/DIAG INJ IV PUSH: CPT

## 2023-04-21 PROCEDURE — 83690 ASSAY OF LIPASE: CPT

## 2023-04-21 PROCEDURE — 25010000002 MORPHINE PER 10 MG: Performed by: FAMILY MEDICINE

## 2023-04-21 PROCEDURE — 99283 EMERGENCY DEPT VISIT LOW MDM: CPT

## 2023-04-21 PROCEDURE — 25510000001 IOPAMIDOL 61 % SOLUTION: Performed by: FAMILY MEDICINE

## 2023-04-21 PROCEDURE — 81001 URINALYSIS AUTO W/SCOPE: CPT | Performed by: FAMILY MEDICINE

## 2023-04-21 PROCEDURE — 81025 URINE PREGNANCY TEST: CPT | Performed by: FAMILY MEDICINE

## 2023-04-21 RX ORDER — MORPHINE SULFATE 2 MG/ML
2 INJECTION, SOLUTION INTRAMUSCULAR; INTRAVENOUS ONCE
Status: COMPLETED | OUTPATIENT
Start: 2023-04-21 | End: 2023-04-21

## 2023-04-21 RX ORDER — ONDANSETRON 2 MG/ML
4 INJECTION INTRAMUSCULAR; INTRAVENOUS ONCE
Status: COMPLETED | OUTPATIENT
Start: 2023-04-21 | End: 2023-04-21

## 2023-04-21 RX ORDER — PROCHLORPERAZINE EDISYLATE 5 MG/ML
10 INJECTION INTRAMUSCULAR; INTRAVENOUS EVERY 6 HOURS PRN
Status: DISCONTINUED | OUTPATIENT
Start: 2023-04-21 | End: 2023-04-21 | Stop reason: HOSPADM

## 2023-04-21 RX ORDER — SODIUM CHLORIDE 0.9 % (FLUSH) 0.9 %
10 SYRINGE (ML) INJECTION AS NEEDED
Status: DISCONTINUED | OUTPATIENT
Start: 2023-04-21 | End: 2023-04-21 | Stop reason: HOSPADM

## 2023-04-21 RX ADMIN — IOPAMIDOL 90 ML: 612 INJECTION, SOLUTION INTRAVENOUS at 15:12

## 2023-04-21 RX ADMIN — ONDANSETRON 4 MG: 2 INJECTION INTRAMUSCULAR; INTRAVENOUS at 12:39

## 2023-04-21 RX ADMIN — PROCHLORPERAZINE EDISYLATE 10 MG: 5 INJECTION INTRAMUSCULAR; INTRAVENOUS at 15:40

## 2023-04-21 RX ADMIN — MORPHINE SULFATE 2 MG: 2 INJECTION, SOLUTION INTRAMUSCULAR; INTRAVENOUS at 15:40

## 2023-04-21 RX ADMIN — SODIUM CHLORIDE 1000 ML: 9 INJECTION, SOLUTION INTRAVENOUS at 14:50

## 2023-04-21 RX ADMIN — MORPHINE SULFATE 2 MG: 2 INJECTION, SOLUTION INTRAMUSCULAR; INTRAVENOUS at 12:39

## 2023-04-21 NOTE — DISCHARGE INSTRUCTIONS
Results were discussed with patient    Advised to follow-up with primary care in 3 days.  Come back to the ER symptoms get worse.

## 2023-04-21 NOTE — ED PROVIDER NOTES
Subjective     History provided by:  Patient   used: No    Patient is a 27 years old who present here today because of nausea and vomiting for the past 2 weeks and progressing getting worse.  Patient had a gastric sleeve done in [place] about a month ago.  She says since then she has been sick.  Cannot keep anything down.    Review of Systems   Gastrointestinal: Positive for abdominal pain, diarrhea, nausea and vomiting.   All other systems reviewed and are negative.      Past Medical History:   Diagnosis Date   • Anemia    • Asthma    • Closed left scapular fracture    • Concussion    • Multiple rib fractures     left       Allergies   Allergen Reactions   • Erythromycin Hives   • Other Hives     Pediazole   • Sulfa Antibiotics Hives   • Vantin [Cefpodoxime]        Past Surgical History:   Procedure Laterality Date   • ADENOIDECTOMY     • APPENDECTOMY     • CHOLECYSTECTOMY     • TONSILLECTOMY         Family History   Problem Relation Age of Onset   • No Known Problems Mother    • No Known Problems Father    • No Known Problems Sister    • No Known Problems Brother    • No Known Problems Daughter    • No Known Problems Son    • No Known Problems Maternal Aunt    • No Known Problems Maternal Uncle    • No Known Problems Paternal Aunt    • No Known Problems Paternal Uncle    • No Known Problems Maternal Grandmother    • No Known Problems Maternal Grandfather    • No Known Problems Paternal Grandmother    • No Known Problems Paternal Grandfather        Social History     Socioeconomic History   • Marital status: Legally    Tobacco Use   • Smoking status: Never   • Smokeless tobacco: Never   Vaping Use   • Vaping Use: Never used   Substance and Sexual Activity   • Alcohol use: Yes     Comment: socially   • Drug use: No   • Sexual activity: Defer           Objective   Physical Exam  Vitals and nursing note reviewed.   Constitutional:       Appearance: She is well-developed. She is obese.    HENT:      Head: Normocephalic.   Eyes:      Extraocular Movements: Extraocular movements intact.   Cardiovascular:      Rate and Rhythm: Normal rate and regular rhythm.      Heart sounds: Normal heart sounds.   Pulmonary:      Effort: Pulmonary effort is normal.      Breath sounds: Normal breath sounds.   Abdominal:      General: Abdomen is flat. Bowel sounds are normal.      Palpations: Abdomen is soft.      Tenderness: There is generalized abdominal tenderness.   Skin:     General: Skin is warm.      Capillary Refill: Capillary refill takes less than 2 seconds.   Neurological:      General: No focal deficit present.      Mental Status: She is alert and oriented to person, place, and time.         Procedures           ED Course  ED Course as of 04/21/23 1600   Fri Apr 21, 2023   1555 Patient felt much better after pain medication was given. [MO]      ED Course User Index  [MO] Jack Baum MD           Labs Reviewed   COMPREHENSIVE METABOLIC PANEL - Abnormal; Notable for the following components:       Result Value    Potassium 3.4 (*)     Chloride 95 (*)     ALT (SGPT) 76 (*)     AST (SGOT) 52 (*)     Anion Gap 19.0 (*)     All other components within normal limits    Narrative:     GFR Normal >60  Chronic Kidney Disease <60  Kidney Failure <15     LIPASE - Abnormal; Notable for the following components:    Lipase 73 (*)     All other components within normal limits   URINALYSIS W/ MICROSCOPIC IF INDICATED (NO CULTURE) - Abnormal; Notable for the following components:    Appearance, UA Cloudy (*)     Ketones, UA >=160 mg/dL (4+) (*)     Blood, UA Moderate (2+) (*)     Protein, UA 30 mg/dL (1+) (*)     All other components within normal limits   CBC WITH AUTO DIFFERENTIAL - Abnormal; Notable for the following components:    Neutrophil % 78.2 (*)     Lymphocyte % 14.7 (*)     All other components within normal limits   URINALYSIS, MICROSCOPIC ONLY - Abnormal; Notable for the following components:     RBC, UA 6-12 (*)     WBC, UA 6-12 (*)     Bacteria, UA 2+ (*)     Squamous Epithelial Cells, UA 6-12 (*)     All other components within normal limits   PREGNANCY, URINE - Normal   RAINBOW DRAW    Narrative:     The following orders were created for panel order [place] Draw.  Procedure                               Abnormality         Status                     ---------                               -----------         ------                     Green Top (Gel)[[id number]]                                  Final result               Lavender Top[[id number]]                                     Final result               Gold Top - SST[[id number]]                                   Final result               Light Blue Top[[id number]]                                   Final result                 Please view results for these tests on the individual orders.   CBC AND DIFFERENTIAL    Narrative:     The following orders were created for panel order CBC & Differential.  Procedure                               Abnormality         Status                     ---------                               -----------         ------                     CBC Auto Differential[[id number]]        Abnormal            Final result                 Please view results for these tests on the individual orders.   GREEN TOP   LAVENDER TOP   GOLD TOP - SST   LIGHT BLUE TOP   EXTRA TUBES    Narrative:     The following orders were created for panel order Extra Tubes.  Procedure                               Abnormality         Status                     ---------                               -----------         ------                     Camp Top[[id number]]                                         In process                   Please view results for these tests on the individual orders.   GRAY TOP       CT Abdomen Pelvis With Contrast   Final Result      1.  Since the last CT there has been gastric surgery for a bypass.  Part of the   excluded stomach  that is located lateral to the surgical clips in the left upper   quadrant shows mild stranding around it and also subtle wall thickening that may   be from acute inflammation and responsible for the abdominal pain.      2.  A 2 cm right ovarian cyst probably represents a dominant follicle.                                      Medical Decision Making  Patient is a 27 years old who present here today because of generalized abdominal pain.  Patient just had a gastric sleeve done a month ago.  Since then he has been vomiting.  CT of abdomen was done which showed postsurgical changes.    Generalized abdominal pain: acute illness or injury  Amount and/or Complexity of Data Reviewed  Labs: ordered.  Radiology: ordered.      Risk  Prescription drug management.          Final diagnoses:   Generalized abdominal pain       ED Disposition  ED Disposition     ED Disposition   Discharge    Condition   Stable    Comment   --             Jena Mcclellan MD  [address] [place] [address]  [phone]    In 3 days           Medication List      No changes were made to your prescriptions during this visit.          Jack Baum MD  04/21/23 [id number]

## 2023-05-10 ENCOUNTER — APPOINTMENT (OUTPATIENT)
Dept: GENERAL RADIOLOGY | Facility: HOSPITAL | Age: 28
End: 2023-05-10
Payer: COMMERCIAL

## 2023-05-10 ENCOUNTER — HOSPITAL ENCOUNTER (EMERGENCY)
Facility: HOSPITAL | Age: 28
Discharge: OTHER INSTITUTION | End: 2023-05-11
Attending: STUDENT IN AN ORGANIZED HEALTH CARE EDUCATION/TRAINING PROGRAM
Payer: COMMERCIAL

## 2023-05-10 ENCOUNTER — APPOINTMENT (OUTPATIENT)
Dept: CT IMAGING | Facility: HOSPITAL | Age: 28
End: 2023-05-10
Payer: COMMERCIAL

## 2023-05-10 DIAGNOSIS — T45.0X1A: ICD-10-CM

## 2023-05-10 DIAGNOSIS — R94.31 QT PROLONGATION: Primary | ICD-10-CM

## 2023-05-10 DIAGNOSIS — R11.0 CHRONIC NAUSEA: ICD-10-CM

## 2023-05-10 DIAGNOSIS — E87.6 HYPOKALEMIA: ICD-10-CM

## 2023-05-10 LAB
ALBUMIN SERPL-MCNC: 4.5 G/DL (ref 3.5–5.2)
ALBUMIN/GLOB SERPL: 1.3 G/DL
ALP SERPL-CCNC: 79 U/L (ref 39–117)
ALT SERPL W P-5'-P-CCNC: 55 U/L (ref 1–33)
ANION GAP SERPL CALCULATED.3IONS-SCNC: 23 MMOL/L (ref 5–15)
AST SERPL-CCNC: 22 U/L (ref 1–32)
BASOPHILS # BLD AUTO: 0.1 10*3/MM3 (ref 0–0.2)
BASOPHILS NFR BLD AUTO: 1 % (ref 0–1.5)
BILIRUB SERPL-MCNC: 0.7 MG/DL (ref 0–1.2)
BUN SERPL-MCNC: 9 MG/DL (ref 6–20)
BUN/CREAT SERPL: 9.4 (ref 7–25)
CALCIUM SPEC-SCNC: 10.2 MG/DL (ref 8.6–10.5)
CHLORIDE SERPL-SCNC: 91 MMOL/L (ref 98–107)
CO2 SERPL-SCNC: 23 MMOL/L (ref 22–29)
CREAT SERPL-MCNC: 0.96 MG/DL (ref 0.57–1)
DEPRECATED RDW RBC AUTO: 46.4 FL (ref 37–54)
EGFRCR SERPLBLD CKD-EPI 2021: 83.9 ML/MIN/1.73
EOSINOPHIL # BLD AUTO: 0.08 10*3/MM3 (ref 0–0.4)
EOSINOPHIL NFR BLD AUTO: 0.8 % (ref 0.3–6.2)
ERYTHROCYTE [DISTWIDTH] IN BLOOD BY AUTOMATED COUNT: 14.6 % (ref 12.3–15.4)
GLOBULIN UR ELPH-MCNC: 3.5 GM/DL
GLUCOSE SERPL-MCNC: 97 MG/DL (ref 65–99)
HCG SERPL QL: NEGATIVE
HCT VFR BLD AUTO: 43.2 % (ref 34–46.6)
HGB BLD-MCNC: 14.6 G/DL (ref 12–15.9)
HOLD SPECIMEN: NORMAL
HOLD SPECIMEN: NORMAL
IMM GRANULOCYTES # BLD AUTO: 0.02 10*3/MM3 (ref 0–0.05)
IMM GRANULOCYTES NFR BLD AUTO: 0.2 % (ref 0–0.5)
LIPASE SERPL-CCNC: 61 U/L (ref 13–60)
LYMPHOCYTES # BLD AUTO: 2.78 10*3/MM3 (ref 0.7–3.1)
LYMPHOCYTES NFR BLD AUTO: 28.9 % (ref 19.6–45.3)
MAGNESIUM SERPL-MCNC: 1.8 MG/DL (ref 1.6–2.6)
MCH RBC QN AUTO: 29.7 PG (ref 26.6–33)
MCHC RBC AUTO-ENTMCNC: 33.8 G/DL (ref 31.5–35.7)
MCV RBC AUTO: 87.8 FL (ref 79–97)
MONOCYTES # BLD AUTO: 0.7 10*3/MM3 (ref 0.1–0.9)
MONOCYTES NFR BLD AUTO: 7.3 % (ref 5–12)
NEUTROPHILS NFR BLD AUTO: 5.93 10*3/MM3 (ref 1.7–7)
NEUTROPHILS NFR BLD AUTO: 61.8 % (ref 42.7–76)
NRBC BLD AUTO-RTO: 0 /100 WBC (ref 0–0.2)
PLATELET # BLD AUTO: 271 10*3/MM3 (ref 140–450)
PMV BLD AUTO: 11.6 FL (ref 6–12)
POTASSIUM SERPL-SCNC: 3 MMOL/L (ref 3.5–5.2)
PROT SERPL-MCNC: 8 G/DL (ref 6–8.5)
QT INTERVAL: 434 MS
QTC INTERVAL: 603 MS
RBC # BLD AUTO: 4.92 10*6/MM3 (ref 3.77–5.28)
SODIUM SERPL-SCNC: 137 MMOL/L (ref 136–145)
WBC NRBC COR # BLD: 9.61 10*3/MM3 (ref 3.4–10.8)
WHOLE BLOOD HOLD COAG: NORMAL
WHOLE BLOOD HOLD SPECIMEN: NORMAL

## 2023-05-10 PROCEDURE — 93005 ELECTROCARDIOGRAM TRACING: CPT

## 2023-05-10 PROCEDURE — 83690 ASSAY OF LIPASE: CPT | Performed by: STUDENT IN AN ORGANIZED HEALTH CARE EDUCATION/TRAINING PROGRAM

## 2023-05-10 PROCEDURE — 85025 COMPLETE CBC W/AUTO DIFF WBC: CPT | Performed by: STUDENT IN AN ORGANIZED HEALTH CARE EDUCATION/TRAINING PROGRAM

## 2023-05-10 PROCEDURE — 71045 X-RAY EXAM CHEST 1 VIEW: CPT

## 2023-05-10 PROCEDURE — 80053 COMPREHEN METABOLIC PANEL: CPT | Performed by: STUDENT IN AN ORGANIZED HEALTH CARE EDUCATION/TRAINING PROGRAM

## 2023-05-10 PROCEDURE — 84703 CHORIONIC GONADOTROPIN ASSAY: CPT | Performed by: STUDENT IN AN ORGANIZED HEALTH CARE EDUCATION/TRAINING PROGRAM

## 2023-05-10 PROCEDURE — 96365 THER/PROPH/DIAG IV INF INIT: CPT

## 2023-05-10 PROCEDURE — 25010000002 LORAZEPAM PER 2 MG

## 2023-05-10 PROCEDURE — 96375 TX/PRO/DX INJ NEW DRUG ADDON: CPT

## 2023-05-10 PROCEDURE — 83735 ASSAY OF MAGNESIUM: CPT | Performed by: STUDENT IN AN ORGANIZED HEALTH CARE EDUCATION/TRAINING PROGRAM

## 2023-05-10 PROCEDURE — 0 POTASSIUM CHLORIDE 10 MEQ/100ML SOLUTION: Performed by: STUDENT IN AN ORGANIZED HEALTH CARE EDUCATION/TRAINING PROGRAM

## 2023-05-10 PROCEDURE — 99284 EMERGENCY DEPT VISIT MOD MDM: CPT

## 2023-05-10 PROCEDURE — 85007 BL SMEAR W/DIFF WBC COUNT: CPT | Performed by: STUDENT IN AN ORGANIZED HEALTH CARE EDUCATION/TRAINING PROGRAM

## 2023-05-10 PROCEDURE — 36415 COLL VENOUS BLD VENIPUNCTURE: CPT

## 2023-05-10 RX ORDER — SODIUM CHLORIDE 9 MG/ML
125 INJECTION, SOLUTION INTRAVENOUS CONTINUOUS
Status: DISCONTINUED | OUTPATIENT
Start: 2023-05-10 | End: 2023-05-11 | Stop reason: HOSPADM

## 2023-05-10 RX ORDER — LORAZEPAM 2 MG/ML
1 INJECTION INTRAMUSCULAR ONCE
Status: COMPLETED | OUTPATIENT
Start: 2023-05-10 | End: 2023-05-10

## 2023-05-10 RX ORDER — LORAZEPAM 2 MG/ML
INJECTION INTRAMUSCULAR
Status: COMPLETED
Start: 2023-05-10 | End: 2023-05-10

## 2023-05-10 RX ORDER — POTASSIUM CHLORIDE 7.45 MG/ML
10 INJECTION INTRAVENOUS
Status: DISPENSED | OUTPATIENT
Start: 2023-05-10 | End: 2023-05-11

## 2023-05-10 RX ADMIN — POTASSIUM CHLORIDE 10 MEQ: 7.46 INJECTION, SOLUTION INTRAVENOUS at 23:40

## 2023-05-10 RX ADMIN — LORAZEPAM 1 MG: 2 INJECTION INTRAMUSCULAR; INTRAVENOUS at 23:38

## 2023-05-10 RX ADMIN — LORAZEPAM 1 MG: 2 INJECTION INTRAMUSCULAR at 23:38

## 2023-05-10 RX ADMIN — SODIUM CHLORIDE 125 ML/HR: 9 INJECTION, SOLUTION INTRAVENOUS at 23:38

## 2023-05-11 ENCOUNTER — APPOINTMENT (OUTPATIENT)
Dept: CT IMAGING | Facility: HOSPITAL | Age: 28
End: 2023-05-11
Payer: COMMERCIAL

## 2023-05-11 VITALS
RESPIRATION RATE: 18 BRPM | DIASTOLIC BLOOD PRESSURE: 62 MMHG | OXYGEN SATURATION: 98 % | SYSTOLIC BLOOD PRESSURE: 108 MMHG | WEIGHT: 195 LBS | TEMPERATURE: 97.6 F | BODY MASS INDEX: 29.55 KG/M2 | HEART RATE: 79 BPM | HEIGHT: 68 IN

## 2023-05-11 LAB
D-LACTATE SERPL-SCNC: 0.9 MMOL/L (ref 0.5–2)
HOLD SPECIMEN: NORMAL
QT INTERVAL: 386 MS
QTC INTERVAL: 456 MS
RBC MORPH BLD: NORMAL
SMALL PLATELETS BLD QL SMEAR: ADEQUATE
TROPONIN T SERPL HS-MCNC: <6 NG/L
WBC MORPH BLD: NORMAL

## 2023-05-11 PROCEDURE — 84484 ASSAY OF TROPONIN QUANT: CPT | Performed by: STUDENT IN AN ORGANIZED HEALTH CARE EDUCATION/TRAINING PROGRAM

## 2023-05-11 PROCEDURE — 0 POTASSIUM CHLORIDE 10 MEQ/100ML SOLUTION: Performed by: STUDENT IN AN ORGANIZED HEALTH CARE EDUCATION/TRAINING PROGRAM

## 2023-05-11 PROCEDURE — 96361 HYDRATE IV INFUSION ADD-ON: CPT

## 2023-05-11 PROCEDURE — 0 DIATRIZOATE MEGLUMINE & SODIUM PER 1 ML: Performed by: STUDENT IN AN ORGANIZED HEALTH CARE EDUCATION/TRAINING PROGRAM

## 2023-05-11 PROCEDURE — 25510000001 IOPAMIDOL 61 % SOLUTION: Performed by: STUDENT IN AN ORGANIZED HEALTH CARE EDUCATION/TRAINING PROGRAM

## 2023-05-11 PROCEDURE — 74177 CT ABD & PELVIS W/CONTRAST: CPT

## 2023-05-11 PROCEDURE — 25010000002 LORAZEPAM PER 2 MG: Performed by: STUDENT IN AN ORGANIZED HEALTH CARE EDUCATION/TRAINING PROGRAM

## 2023-05-11 PROCEDURE — 96376 TX/PRO/DX INJ SAME DRUG ADON: CPT

## 2023-05-11 PROCEDURE — 83605 ASSAY OF LACTIC ACID: CPT | Performed by: STUDENT IN AN ORGANIZED HEALTH CARE EDUCATION/TRAINING PROGRAM

## 2023-05-11 PROCEDURE — 93005 ELECTROCARDIOGRAM TRACING: CPT | Performed by: STUDENT IN AN ORGANIZED HEALTH CARE EDUCATION/TRAINING PROGRAM

## 2023-05-11 RX ORDER — LORAZEPAM 2 MG/ML
1 INJECTION INTRAMUSCULAR ONCE
Status: COMPLETED | OUTPATIENT
Start: 2023-05-11 | End: 2023-05-11

## 2023-05-11 RX ADMIN — IOPAMIDOL 90 ML: 612 INJECTION, SOLUTION INTRAVENOUS at 00:57

## 2023-05-11 RX ADMIN — POTASSIUM CHLORIDE 10 MEQ: 7.46 INJECTION, SOLUTION INTRAVENOUS at 07:11

## 2023-05-11 RX ADMIN — SODIUM CHLORIDE 1000 ML: 9 INJECTION, SOLUTION INTRAVENOUS at 00:08

## 2023-05-11 RX ADMIN — POTASSIUM CHLORIDE 10 MEQ: 7.46 INJECTION, SOLUTION INTRAVENOUS at 01:24

## 2023-05-11 RX ADMIN — LORAZEPAM 1 MG: 2 INJECTION INTRAMUSCULAR; INTRAVENOUS at 04:36

## 2023-05-11 RX ADMIN — POTASSIUM CHLORIDE 10 MEQ: 7.46 INJECTION, SOLUTION INTRAVENOUS at 04:42

## 2023-05-11 RX ADMIN — DIATRIZOATE MEGLUMINE AND DIATRIZOATE SODIUM 30 ML: 660; 100 LIQUID ORAL; RECTAL at 00:57

## 2023-05-11 NOTE — ED PROVIDER NOTES
Subjective   History of Present Illness  Patient presents with tachycardic episodes with chest pain, shortness of breath, abdominal pain and chronic nausea.  Today at work, patient had another 1 of these episodes where she felt like she was going to pass out and sat down and her heart rate was measured at 150 bpm.  Patient had anterior chest pain that she rates as pressure on her sternum with no radiation that lasted 30 minutes.  She has several of these episodes, sometimes waking her up from sleep, where she states they last between 5 and 40 minutes.  Patient has a recent history of tachycardia syndrome as well as chronic nausea after having a gastric sleeve placed in [facility] the end of March of this year.  Since then, patient has been taking a lot of Zofran, Phenergan, and Compazine for abdominal pain and nausea and has not been able to eat solid food since before her surgery.  She endorses losing 40 pounds since her surgery.  She was hospitalized in [facility] last month with abdominal pain and was placed on amiodarone drip for her tachycardia.  They did find hematoma next to her sleep and a drainage was placed which was since removed.  She was sent to [facility] for higher level of care for evaluation of her gastric sleeve findings.  Patient was discharged on 4/29 from [facility].        Review of Systems   Constitutional: Positive for appetite change. Negative for activity change.   HENT: Negative for congestion and ear pain.    Eyes: Negative for pain and discharge.   Respiratory: Negative for chest tightness and shortness of breath.    Cardiovascular: Positive for chest pain and palpitations.   Gastrointestinal: Positive for abdominal pain, nausea and vomiting. Negative for abdominal distention.   Endocrine: Negative for cold intolerance and heat intolerance.   Genitourinary: Negative for difficulty urinating and dysuria.   Musculoskeletal: Negative for arthralgias and back pain.   Skin: Negative for color  change and rash.   Allergic/Immunologic: Negative for environmental allergies and food allergies.   Neurological: Positive for syncope and light-headedness. Negative for dizziness and headaches.   Hematological: Negative for adenopathy. Does not bruise/bleed easily.   Psychiatric/Behavioral: Negative for agitation and confusion.       Past Medical History:   Diagnosis Date   • Anemia    • Asthma    • Closed left scapular fracture    • Concussion    • Multiple rib fractures     left       Allergies   Allergen Reactions   • Erythromycin Hives   • Other Hives     Pediazole   • Sulfa Antibiotics Hives   • Vantin [Cefpodoxime]        Past Surgical History:   Procedure Laterality Date   • ADENOIDECTOMY     • APPENDECTOMY     • CHOLECYSTECTOMY     • TONSILLECTOMY         Family History   Problem Relation Age of Onset   • No Known Problems Mother    • No Known Problems Father    • No Known Problems Sister    • No Known Problems Brother    • No Known Problems Daughter    • No Known Problems Son    • No Known Problems Maternal Aunt    • No Known Problems Maternal Uncle    • No Known Problems Paternal Aunt    • No Known Problems Paternal Uncle    • No Known Problems Maternal Grandmother    • No Known Problems Maternal Grandfather    • No Known Problems Paternal Grandmother    • No Known Problems Paternal Grandfather        Social History     Socioeconomic History   • Marital status: Legally    Tobacco Use   • Smoking status: Never   • Smokeless tobacco: Never   Vaping Use   • Vaping Use: Never used   Substance and Sexual Activity   • Alcohol use: Yes     Comment: socially   • Drug use: No   • Sexual activity: Defer           Objective   Physical Exam  Vitals and nursing note reviewed.   Constitutional:       Appearance: She is well-developed.   HENT:      Head: Normocephalic and atraumatic.   Eyes:      Pupils: Pupils are equal, round, and reactive to light.   Neck:      Thyroid: No thyromegaly.      Vascular: No JVD.       Trachea: No tracheal deviation.   Cardiovascular:      Rate and Rhythm: Normal rate.      Pulses:           Radial pulses are 2+ on the right side and 2+ on the left side.        Dorsalis pedis pulses are 2+ on the right side and 2+ on the left side.      Heart sounds: Normal heart sounds, S1 normal and S2 normal.   Pulmonary:      Effort: Pulmonary effort is normal.      Breath sounds: Normal breath sounds.   Abdominal:      General: Bowel sounds are normal.      Tenderness: There is abdominal tenderness (mid and right upper quad).   Musculoskeletal:         General: Normal range of motion.   Skin:     General: Skin is warm and dry.      Capillary Refill: Capillary refill takes 2 to 3 seconds.   Neurological:      Mental Status: She is alert and oriented to person, place, and time.      GCS: GCS eye subscore is 4. GCS verbal subscore is 5. GCS motor subscore is 6.   Psychiatric:         Speech: Speech normal.         Behavior: Behavior normal.         Thought Content: Thought content normal.         ECG 12 Lead      Date/Time: 5/10/2023 11:17 PM  Performed by: Rui Dior MD  Authorized by: Rui Dior MD   Interpreted by physician  Comparison: compared with previous ECG from 4/16/2023  Similar to previous ECG  Rhythm: sinus tachycardia  Comments: Sinus tachycardia with P wave inversion in lead aVL, ventricular rate 116 bpm, NJ interval 66 ms, QRS 62 ms, QTc 603 ms      ECG 12 Lead      Date/Time: 5/11/2023 5:48 AM  Performed by: Rui Dior MD  Authorized by: Rui Dior MD   Interpreted by physician  Comparison: compared with previous ECG from 5/10/2023  Similar to previous ECG  Rhythm: sinus rhythm  Comments: Sinus rhythm 84 bpm, NJ interval 108 ms, QRS 78 ms, QTc 456 ms                 ED Course  ED Course as of 05/11/23 0549   Wed May 10, 2023   2313 Patient with positive orthostatics with heart rate ranging from 91 lying, 96 sitting, and 140 with standing with severe  dizziness and chest pain.  Heart rate went back down to the low 100s after short rest. [SHAWNEE]   Thu May 11, 2023   0147 [facility].  Awaiting callback. [SHAWNEE]   [facility]2 [facility] unable to [facility] secondary to bariatric surgery.  Called [facility] to initiate transfer.  Awaiting callback. [SHAWNEE]   0337 Discussed case with Dr. Hernandez at [facility].  He would like troponin and repeat EKG and a lactic with a call back with results. [SHAWNEE]   0440 Patient was accepted by Dr. Hernandez at [facility].  Will call back with bed. [SHAWNEE]      ED Course User Index  [SHAWNEE] Rui Dior MD                Vitals:    05/11/23 0515 05/11/23 0530 05/11/23 0535 05/11/23 0540   BP: 94/55 96/55     BP Location:       Patient Position:       Pulse: 80 84 84 80   Resp:       Temp:       TempSrc:       SpO2: 97% 97% 98% 98%   Weight:       Height:          Lab Results (last 24 hours)     Procedure Component Value Units Date/Time    Single High Sensitivity Troponin T [[id number]]  (Normal) Collected: 05/11/23 0402    Specimen: Blood from Arm, Left Updated: 05/11/23 0439     HS Troponin T <6 ng/L     Narrative:      High Sensitive Troponin T Reference Range:  <10.0 ng/L- Negative Female for AMI  <15.0 ng/L- Negative Male for AMI  >=10 - Abnormal Female indicating possible myocardial injury.  >=15 - Abnormal Male indicating possible myocardial injury.   Clinicians would have to utilize clinical acumen, EKG, Troponin, and serial changes to determine if it is an Acute Myocardial Infarction or myocardial injury due to an underlying chronic condition.         Lactic Acid, Plasma [[id number]]  (Normal) Collected: 05/11/23 0402    Specimen: Blood from Arm, Left Updated: 05/11/23 0435     Lactate 0.9 mmol/L     Extra Tubes [[id number]] Collected: 05/10/23 2208    Specimen: Blood Updated: 05/11/23 0215    Narrative:      The following orders were created for panel order Extra Tubes.  Procedure                                Abnormality         Status                     ---------                               -----------         ------                     Lavender Top[[id number]]                                     Final result               Gold Top - SST[[id number]]                                   Final result               Green Top (Gel)[[id number]]                                  Final result               Gray Top[[id number]]                                         Final result               Light Blue Top[[id number]]                                   Final result                 Please view results for these tests on the individual orders.    Gray Top [[id number]] Collected: 05/10/23 2208    Specimen: Blood Updated: 05/11/23 0215     Extra Tube Hold for add-ons.     Comment: Auto resulted.       CBC & Differential [[id number]] Collected: 05/10/23 2208    Specimen: Blood Updated: 05/11/23 0004    Narrative:      The following orders were created for panel order CBC & Differential.  Procedure                               Abnormality         Status                     ---------                               -----------         ------                     CBC Auto Differential[[id number]]        Normal              Final result               Scan Slide[[id number]]                                       Final result                 Please view results for these tests on the individual orders.    Scan Slide [[id number]] Collected: 05/10/23 2208    Specimen: Blood Updated: 05/11/23 0004     RBC Morphology Normal     WBC Morphology Normal     Platelet Estimate Adequate    Lipase [[id number]]  (Abnormal) Collected: 05/10/23 2208    Specimen: Blood Updated: 05/10/23 2322     Lipase 61 U/L     CBC Auto Differential [[id number]]  (Normal) Collected: 05/10/23 2208    Specimen: Blood Updated: 05/10/23 2317     WBC 9.61 10*3/mm3      RBC 4.92 10*6/mm3      Hemoglobin 14.6 g/dL      Hematocrit 43.2 %      MCV 87.8 fL      MCH 29.7 pg      MCHC 33.8 g/dL       RDW 14.6 %      RDW-SD 46.4 fl      MPV 11.6 fL      Platelets 271 10*3/mm3      Neutrophil % 61.8 %      Lymphocyte % 28.9 %      Monocyte % 7.3 %      Eosinophil % 0.8 %      Basophil % 1.0 %      Immature Grans % 0.2 %      Neutrophils, Absolute 5.93 10*3/mm3      Lymphocytes, Absolute 2.78 10*3/mm3      Monocytes, Absolute 0.70 10*3/mm3      Eosinophils, Absolute 0.08 10*3/mm3      Basophils, Absolute 0.10 10*3/mm3      Immature Grans, Absolute 0.02 10*3/mm3      nRBC 0.0 /100 WBC     Lavender Top [[id number]] Collected: 05/10/23 2208    Specimen: Blood Updated: 05/10/23 2315     Extra Tube hold for add-on     Comment: Auto resulted       Gold Top - SST [[id number]] Collected: 05/10/23 2208    Specimen: Blood Updated: 05/10/23 2315     Extra Tube Hold for add-ons.     Comment: Auto resulted.       Green Top (Gel) [[id number]] Collected: 05/10/23 2208    Specimen: Blood Updated: 05/10/23 2315     Extra Tube Hold for add-ons.     Comment: Auto resulted.       Light Blue Top [[id number]] Collected: 05/10/23 2208    Specimen: Blood Updated: 05/10/23 2315     Extra Tube Hold for add-ons.     Comment: Auto resulted       Comprehensive Metabolic Panel [[id number]]  (Abnormal) Collected: 05/10/23 2208    Specimen: Blood Updated: 05/10/23 2306     Glucose 97 mg/dL      BUN 9 mg/dL      Creatinine 0.96 mg/dL      Sodium 137 mmol/L      Potassium 3.0 mmol/L      Chloride 91 mmol/L      CO2 23.0 mmol/L      Calcium 10.2 mg/dL      Total Protein 8.0 g/dL      Albumin 4.5 g/dL      ALT (SGPT) 55 U/L      AST (SGOT) 22 U/L      Alkaline Phosphatase 79 U/L      Total Bilirubin 0.7 mg/dL      Globulin 3.5 gm/dL      A/G Ratio 1.3 g/dL      BUN/Creatinine Ratio 9.4     Anion Gap 23.0 mmol/L      eGFR 83.9 mL/min/1.73     Narrative:      GFR Normal >60  Chronic Kidney Disease <60  Kidney Failure <15      Magnesium [[id number]]  (Normal) Collected: 05/10/23 2208    Specimen: Blood Updated: 05/10/23 2306     Magnesium 1.8 mg/dL      hCG, Serum, Qualitative [[id number]]  (Normal) Collected: 05/10/23 2208    Specimen: Blood Updated: 05/10/23 2303     HCG Qualitative Negative         CT Abdomen Pelvis With Contrast    Result Date: 5/11/2023  No CT evidence of acute abdominopelvic pathology.    XR Chest 1 View    Result Date: 5/10/2023  FINDINGS/IMPRESSION: No consolidative pulmonary opacity, pleural effusion, or pneumothorax. Heart is normal in size.                                 Medical Decision Making  [place] to tolerate solid oral intake since surgery and chronic nausea.  Patient with intermittent tachycardia and prolonged QTc of greater than 600 on initial EKG.  Patient using Zofran, Compazine, Phenergan, multiple doses round-the-clock for chronic nausea.  Repeat EKG several hours after admission with QTc of 458.  Labs, CT abdomen, chest x-ray unremarkable besides mild hypokalemia.  Patient is orthostatic.  IV fluids given.  Discussed case with hospitalist at [facility] who accepts patient for transfer.    Accidental overdose of 5-hydroxytryptamine-3 receptor antagonist: acute illness or injury  Chronic nausea: acute illness or injury  Hypokalemia: acute illness or injury  QT prolongation: acute illness or injury  Amount and/or Complexity of Data Reviewed  Labs: ordered.  Radiology: ordered.  ECG/medicine tests: ordered and independent interpretation performed.      Risk  Prescription drug management.          Final diagnoses:   QT prolongation   Chronic nausea   Hypokalemia   Accidental overdose of 5-hydroxytryptamine-3 receptor antagonist       ED Disposition  ED Disposition     ED Disposition   Transfer to Another Facility     Condition   --    Comment   [facility]             No follow-up provider specified.       Medication List      No changes were made to your prescriptions during this visit.       This document has been electronically signed by Rui Dior MD on May  11, 2023 05:52 CDT    Rui Dior MD   Part of this note may be an electronic transcription/translation of spoken language to printed text using the Dragon Dictation System.        Rui Dior MD  05/12/23 0102

## 2023-05-22 LAB
QT INTERVAL: 386 MS
QT INTERVAL: 434 MS
QTC INTERVAL: 456 MS
QTC INTERVAL: 603 MS

## 2023-05-24 ENCOUNTER — APPOINTMENT (OUTPATIENT)
Dept: CT IMAGING | Facility: HOSPITAL | Age: 28
End: 2023-05-24
Payer: COMMERCIAL

## 2023-05-24 ENCOUNTER — HOSPITAL ENCOUNTER (EMERGENCY)
Facility: HOSPITAL | Age: 28
Discharge: HOME | End: 2023-05-24
Attending: EMERGENCY MEDICINE | Admitting: EMERGENCY MEDICINE
Payer: COMMERCIAL

## 2023-05-24 VITALS
SYSTOLIC BLOOD PRESSURE: 130 MMHG | TEMPERATURE: 97.5 F | BODY MASS INDEX: 28.64 KG/M2 | RESPIRATION RATE: 18 BRPM | WEIGHT: 189 LBS | OXYGEN SATURATION: 97 % | HEART RATE: 68 BPM | HEIGHT: 68 IN | DIASTOLIC BLOOD PRESSURE: 70 MMHG

## 2023-05-24 DIAGNOSIS — Z90.3 S/P GASTRIC SLEEVE PROCEDURE: ICD-10-CM

## 2023-05-24 DIAGNOSIS — R10.9 ABDOMINAL PAIN, UNSPECIFIED ABDOMINAL LOCATION: Primary | ICD-10-CM

## 2023-05-24 DIAGNOSIS — B37.9 YEAST INFECTION: ICD-10-CM

## 2023-05-24 LAB
ALBUMIN SERPL-MCNC: 4.1 G/DL (ref 3.5–5.2)
ALBUMIN/GLOB SERPL: 1.3 G/DL
ALP SERPL-CCNC: 73 U/L (ref 39–117)
ALT SERPL W P-5'-P-CCNC: 37 U/L (ref 1–33)
AMPHET+METHAMPHET UR QL: NEGATIVE
AMPHETAMINES UR QL: NEGATIVE
ANION GAP SERPL CALCULATED.3IONS-SCNC: 21 MMOL/L (ref 5–15)
AST SERPL-CCNC: 31 U/L (ref 1–32)
B-HCG UR QL: NEGATIVE
BACTERIA UR QL AUTO: ABNORMAL /HPF
BARBITURATES UR QL SCN: NEGATIVE
BASOPHILS # BLD AUTO: 0.05 10*3/MM3 (ref 0–0.2)
BASOPHILS NFR BLD AUTO: 0.6 % (ref 0–1.5)
BENZODIAZ UR QL SCN: POSITIVE
BILIRUB SERPL-MCNC: 0.4 MG/DL (ref 0–1.2)
BILIRUB UR QL STRIP: NEGATIVE
BUN SERPL-MCNC: 5 MG/DL (ref 6–20)
BUN/CREAT SERPL: 10.2 (ref 7–25)
BUPRENORPHINE SERPL-MCNC: NEGATIVE NG/ML
CALCIUM SPEC-SCNC: 9.6 MG/DL (ref 8.6–10.5)
CANNABINOIDS SERPL QL: NEGATIVE
CHLORIDE SERPL-SCNC: 96 MMOL/L (ref 98–107)
CLARITY UR: CLEAR
CO2 SERPL-SCNC: 21 MMOL/L (ref 22–29)
COCAINE UR QL: NEGATIVE
COLOR UR: YELLOW
CREAT SERPL-MCNC: 0.49 MG/DL (ref 0.57–1)
DEPRECATED RDW RBC AUTO: 44 FL (ref 37–54)
EGFRCR SERPLBLD CKD-EPI 2021: 133.5 ML/MIN/1.73
EOSINOPHIL # BLD AUTO: 0.04 10*3/MM3 (ref 0–0.4)
EOSINOPHIL NFR BLD AUTO: 0.5 % (ref 0.3–6.2)
ERYTHROCYTE [DISTWIDTH] IN BLOOD BY AUTOMATED COUNT: 13.9 % (ref 12.3–15.4)
FENTANYL UR-MCNC: NEGATIVE NG/ML
GLOBULIN UR ELPH-MCNC: 3.2 GM/DL
GLUCOSE SERPL-MCNC: 86 MG/DL (ref 65–99)
GLUCOSE UR STRIP-MCNC: NEGATIVE MG/DL
HCG SERPL QL: NEGATIVE
HCT VFR BLD AUTO: 38.6 % (ref 34–46.6)
HGB BLD-MCNC: 13.1 G/DL (ref 12–15.9)
HGB UR QL STRIP.AUTO: ABNORMAL
HOLD SPECIMEN: NORMAL
HYALINE CASTS UR QL AUTO: ABNORMAL /LPF
IMM GRANULOCYTES # BLD AUTO: 0.02 10*3/MM3 (ref 0–0.05)
IMM GRANULOCYTES NFR BLD AUTO: 0.3 % (ref 0–0.5)
KETONES UR QL STRIP: ABNORMAL
LEUKOCYTE ESTERASE UR QL STRIP.AUTO: NEGATIVE
LIPASE SERPL-CCNC: 53 U/L (ref 13–60)
LYMPHOCYTES # BLD AUTO: 1.9 10*3/MM3 (ref 0.7–3.1)
LYMPHOCYTES NFR BLD AUTO: 24.3 % (ref 19.6–45.3)
MCH RBC QN AUTO: 29.4 PG (ref 26.6–33)
MCHC RBC AUTO-ENTMCNC: 33.9 G/DL (ref 31.5–35.7)
MCV RBC AUTO: 86.7 FL (ref 79–97)
METHADONE UR QL SCN: NEGATIVE
MONOCYTES # BLD AUTO: 0.58 10*3/MM3 (ref 0.1–0.9)
MONOCYTES NFR BLD AUTO: 7.4 % (ref 5–12)
NEUTROPHILS NFR BLD AUTO: 5.23 10*3/MM3 (ref 1.7–7)
NEUTROPHILS NFR BLD AUTO: 66.9 % (ref 42.7–76)
NITRITE UR QL STRIP: NEGATIVE
NRBC BLD AUTO-RTO: 0 /100 WBC (ref 0–0.2)
OPIATES UR QL: NEGATIVE
OXYCODONE UR QL SCN: NEGATIVE
PCP UR QL SCN: NEGATIVE
PH UR STRIP.AUTO: 6 [PH] (ref 5–9)
PLATELET # BLD AUTO: 258 10*3/MM3 (ref 140–450)
PMV BLD AUTO: 10.2 FL (ref 6–12)
POTASSIUM SERPL-SCNC: 3.2 MMOL/L (ref 3.5–5.2)
PROPOXYPH UR QL: NEGATIVE
PROT SERPL-MCNC: 7.3 G/DL (ref 6–8.5)
PROT UR QL STRIP: ABNORMAL
RBC # BLD AUTO: 4.45 10*6/MM3 (ref 3.77–5.28)
RBC # UR STRIP: ABNORMAL /HPF
REF LAB TEST METHOD: ABNORMAL
SODIUM SERPL-SCNC: 138 MMOL/L (ref 136–145)
SP GR UR STRIP: 1.02 (ref 1–1.03)
SQUAMOUS #/AREA URNS HPF: ABNORMAL /HPF
TRICYCLICS UR QL SCN: NEGATIVE
UROBILINOGEN UR QL STRIP: ABNORMAL
WBC # UR STRIP: ABNORMAL /HPF
WBC NRBC COR # BLD: 7.82 10*3/MM3 (ref 3.4–10.8)
WHOLE BLOOD HOLD COAG: NORMAL
WHOLE BLOOD HOLD SPECIMEN: NORMAL
YEAST URNS QL MICRO: ABNORMAL /HPF

## 2023-05-24 PROCEDURE — 85025 COMPLETE CBC W/AUTO DIFF WBC: CPT | Performed by: NURSE PRACTITIONER

## 2023-05-24 PROCEDURE — 81025 URINE PREGNANCY TEST: CPT | Performed by: NURSE PRACTITIONER

## 2023-05-24 PROCEDURE — 84703 CHORIONIC GONADOTROPIN ASSAY: CPT | Performed by: NURSE PRACTITIONER

## 2023-05-24 PROCEDURE — 25010000002 METOCLOPRAMIDE PER 10 MG: Performed by: NURSE PRACTITIONER

## 2023-05-24 PROCEDURE — 25010000002 PROCHLORPERAZINE 10 MG/2ML SOLUTION: Performed by: NURSE PRACTITIONER

## 2023-05-24 PROCEDURE — 36415 COLL VENOUS BLD VENIPUNCTURE: CPT

## 2023-05-24 PROCEDURE — 83690 ASSAY OF LIPASE: CPT | Performed by: NURSE PRACTITIONER

## 2023-05-24 PROCEDURE — 81001 URINALYSIS AUTO W/SCOPE: CPT | Performed by: NURSE PRACTITIONER

## 2023-05-24 PROCEDURE — 0 DIATRIZOATE MEGLUMINE & SODIUM PER 1 ML: Performed by: EMERGENCY MEDICINE

## 2023-05-24 PROCEDURE — 99283 EMERGENCY DEPT VISIT LOW MDM: CPT

## 2023-05-24 PROCEDURE — 96375 TX/PRO/DX INJ NEW DRUG ADDON: CPT

## 2023-05-24 PROCEDURE — 96374 THER/PROPH/DIAG INJ IV PUSH: CPT

## 2023-05-24 PROCEDURE — 74177 CT ABD & PELVIS W/CONTRAST: CPT

## 2023-05-24 PROCEDURE — P9612 CATHETERIZE FOR URINE SPEC: HCPCS

## 2023-05-24 PROCEDURE — 25510000001 IOPAMIDOL 61 % SOLUTION: Performed by: EMERGENCY MEDICINE

## 2023-05-24 PROCEDURE — 80307 DRUG TEST PRSMV CHEM ANLYZR: CPT | Performed by: NURSE PRACTITIONER

## 2023-05-24 PROCEDURE — 80053 COMPREHEN METABOLIC PANEL: CPT | Performed by: NURSE PRACTITIONER

## 2023-05-24 RX ORDER — FLUCONAZOLE 150 MG/1
150 TABLET ORAL
Qty: 3 TABLET | Refills: 0 | Status: SHIPPED | OUTPATIENT
Start: 2023-05-24 | End: 2023-06-02

## 2023-05-24 RX ORDER — ONDANSETRON 4 MG/1
4 TABLET, ORALLY DISINTEGRATING ORAL EVERY 8 HOURS PRN
Qty: 9 TABLET | Refills: 0 | Status: SHIPPED | OUTPATIENT
Start: 2023-05-24 | End: 2023-05-27

## 2023-05-24 RX ORDER — PROCHLORPERAZINE EDISYLATE 5 MG/ML
10 INJECTION INTRAMUSCULAR; INTRAVENOUS ONCE
Status: COMPLETED | OUTPATIENT
Start: 2023-05-24 | End: 2023-05-24

## 2023-05-24 RX ORDER — METOCLOPRAMIDE HYDROCHLORIDE 5 MG/ML
10 INJECTION INTRAMUSCULAR; INTRAVENOUS EVERY 6 HOURS
Status: DISCONTINUED | OUTPATIENT
Start: 2023-05-24 | End: 2023-05-24 | Stop reason: HOSPADM

## 2023-05-24 RX ADMIN — SODIUM CHLORIDE, POTASSIUM CHLORIDE, SODIUM LACTATE AND CALCIUM CHLORIDE 1000 ML: 600; 310; 30; 20 INJECTION, SOLUTION INTRAVENOUS at 17:39

## 2023-05-24 RX ADMIN — IOPAMIDOL 90 ML: 612 INJECTION, SOLUTION INTRAVENOUS at 19:39

## 2023-05-24 RX ADMIN — DIATRIZOATE MEGLUMINE AND DIATRIZOATE SODIUM 30 ML: 660; 100 LIQUID ORAL; RECTAL at 19:39

## 2023-05-24 RX ADMIN — METOCLOPRAMIDE 10 MG: 5 INJECTION, SOLUTION INTRAMUSCULAR; INTRAVENOUS at 17:39

## 2023-05-24 RX ADMIN — PROCHLORPERAZINE EDISYLATE 10 MG: 5 INJECTION INTRAMUSCULAR; INTRAVENOUS at 18:49

## 2023-05-25 NOTE — ED PROVIDER NOTES
Subjective   History of Present Illness  Patient is a 27-year-old female that presents the emergency room with complaints of abdominal pain, nausea and unable to eat.  She went to [facility] at the end of March and had a gastric sleeve and has had problems since.  She has had several visits to the ER here and at [place].  She has also been transferred to [facility] to Dr. Miguelito Matias 2 other times.  She reports taking lots of Compazine and Zofran at home.  She reports losing approximately 40 pounds since surgery.  Denies being able to eat or keep anything down at this time.  Vital signs are stable.  She was originally sent over from the doctor's office for hypertension.  Blood pressure has been 130s over 70 and below here.            Review of Systems   Constitutional: Positive for appetite change.   HENT: Negative.    Eyes: Negative.    Respiratory: Negative.    Cardiovascular: Negative.    Gastrointestinal: Positive for abdominal pain and nausea.   Endocrine: Negative.    Genitourinary: Negative.    Musculoskeletal: Negative.    Skin: Negative.    Allergic/Immunologic: Negative.    Neurological: Negative.    Hematological: Negative.    Psychiatric/Behavioral: Negative.        Past Medical History:   Diagnosis Date   • Anemia    • Asthma    • Closed left scapular fracture    • Concussion    • Multiple rib fractures     left       Allergies   Allergen Reactions   • Erythromycin Hives   • Other Hives     Pediazole   • Sulfa Antibiotics Hives   • Vantin [Cefpodoxime]        Past Surgical History:   Procedure Laterality Date   • ADENOIDECTOMY     • APPENDECTOMY     • CHOLECYSTECTOMY     • TONSILLECTOMY         Family History   Problem Relation Age of Onset   • No Known Problems Mother    • No Known Problems Father    • No Known Problems Sister    • No Known Problems Brother    • No Known Problems Daughter    • No Known Problems Son    • No Known Problems Maternal Aunt    • No Known Problems Maternal Uncle    • No  Known Problems Paternal Aunt    • No Known Problems Paternal Uncle    • No Known Problems Maternal Grandmother    • No Known Problems Maternal Grandfather    • No Known Problems Paternal Grandmother    • No Known Problems Paternal Grandfather        Social History     Socioeconomic History   • Marital status: Legally    Tobacco Use   • Smoking status: Never   • Smokeless tobacco: Never   Vaping Use   • Vaping Use: Never used   Substance and Sexual Activity   • Alcohol use: Yes     Comment: socially   • Drug use: No   • Sexual activity: Defer           Objective   Physical Exam  Vitals and nursing note reviewed.   Constitutional:       General: She is not in acute distress.     Appearance: Normal appearance. She is not ill-appearing.   HENT:      Head: Normocephalic and atraumatic.      Right Ear: Tympanic membrane, ear canal and external ear normal.      Left Ear: Tympanic membrane, ear canal and external ear normal.      Nose: Nose normal.      Mouth/Throat:      Mouth: Mucous membranes are moist.      Pharynx: Oropharynx is clear.   Eyes:      Extraocular Movements: Extraocular movements intact.      Conjunctiva/sclera: Conjunctivae normal.      Pupils: Pupils are equal, round, and reactive to light.   Cardiovascular:      Rate and Rhythm: Normal rate and regular rhythm.      Pulses: Normal pulses.      Heart sounds: Normal heart sounds.   Pulmonary:      Effort: Pulmonary effort is normal.      Breath sounds: Normal breath sounds.   Abdominal:      General: Bowel sounds are normal. There is no distension.      Palpations: Abdomen is soft. There is no mass.      Tenderness: There is abdominal tenderness. There is no right CVA tenderness, left CVA tenderness, guarding or rebound.      Hernia: No hernia is present.   Musculoskeletal:         General: Normal range of motion.      Cervical back: Normal range of motion and neck supple.   Skin:     General: Skin is warm and dry.      Capillary Refill: Capillary  refill takes less than 2 seconds.   Neurological:      General: No focal deficit present.      Mental Status: She is alert and oriented to person, place, and time.   Psychiatric:         Mood and Affect: Mood normal.         Behavior: Behavior normal.         Thought Content: Thought content normal.         Judgment: Judgment normal.         Procedures           ED Course        Labs Reviewed   COMPREHENSIVE METABOLIC PANEL - Abnormal; Notable for the following components:       Result Value    BUN 5 (*)     Creatinine 0.49 (*)     Potassium 3.2 (*)     Chloride 96 (*)     CO2 21.0 (*)     ALT (SGPT) 37 (*)     Anion Gap 21.0 (*)     All other components within normal limits    Narrative:     GFR Normal >60  Chronic Kidney Disease <60  Kidney Failure <15     URINALYSIS W/ CULTURE IF INDICATED - Abnormal; Notable for the following components:    Ketones, UA >=160 mg/dL (4+) (*)     Blood, UA Moderate (2+) (*)     Protein, UA 30 mg/dL (1+) (*)     All other components within normal limits    Narrative:     In absence of clinical symptoms, the presence of pyuria, bacteria, and/or nitrites on the urinalysis result does not correlate with infection.   URINALYSIS, MICROSCOPIC ONLY - Abnormal; Notable for the following components:    RBC, UA 6-12 (*)     Bacteria, UA 1+ (*)     Squamous Epithelial Cells, UA 3-5 (*)     All other components within normal limits   URINE DRUG SCREEN - Abnormal; Notable for the following components:    Benzodiazepine Screen, Urine Positive (*)     All other components within normal limits    Narrative:     Cutoff For Drugs Screened:    Amphetamines               500 ng/ml  Barbiturates               200 ng/ml  Benzodiazepines            150 ng/ml  Cocaine                    150 ng/ml  Methadone                  200 ng/ml  Opiates                    100 ng/ml  Phencyclidine               25 ng/ml  THC                            50 ng/ml  Methamphetamine            500 ng/ml  Tricyclic  Antidepressants  300 ng/ml  Oxycodone                  100 ng/ml  Propoxyphene               300 ng/ml  Buprenorphine               10 ng/ml    The normal value for all drugs tested is negative. This report includes unconfirmed screening results, with the cutoff values listed, to be used for medical treatment purposes only.  Unconfirmed results must not be used for non-medical purposes such as employment or legal testing.  Clinical consideration should be applied to any drug of abuse test, particularly when unconfirmed results are used.     LIPASE - Normal   PREGNANCY, URINE - Normal   CBC WITH AUTO DIFFERENTIAL - Normal   HCG, SERUM, QUALITATIVE - Normal   FENTANYL, URINE - Normal    Narrative:     Negative Threshold:      Fentanyl 5 ng/mL     The normal value for the drug tested is negative. This report includes final unconfirmed screening results to be used for medical treatment purposes only. Unconfirmed results must not be used for non-medical purposes such as employment or legal testing. Clinical consideration should be applied to any drug of abuse test, particularly when unconfirmed results are used.          CBC AND DIFFERENTIAL    Narrative:     The following orders were created for panel order CBC & Differential.  Procedure                               Abnormality         Status                     ---------                               -----------         ------                     CBC Auto Differential[[id number]]        Normal              Final result                 Please view results for these tests on the individual orders.   LAVENDER TOP   GOLD TOP - SST   GREEN TOP   LIGHT BLUE TOP   EXTRA TUBES    Narrative:     The following orders were created for panel order Extra Tubes.  Procedure                               Abnormality         Status                     ---------                               -----------         ------                     Lavender Top[[id number]]                                      Final result               Gold Top - SST[[id number]]                                   Final result               Green Top (Gel)[[id number]]                                  Final result               Camp Top[[id number]]                                         In process                 Light Blue Top[[id number]]                                   Final result                 Please view results for these tests on the individual orders.   GRAY TOP       CT Abdomen Pelvis With Contrast   Preliminary Result   No acute intra-abdominal process to account for patient's symptoms.                                               Medical Decision Making  Differential diagnosis.  Pancreatitis, bowel obstruction, surgical complications, gastroenteritis,   27-year-old female presents with chronic abdominal pain status post gastric sleeve in [facility].  He has seen Dr. Miguelito Matias at [place] and has a follow-up appointment on Wednesday.  She reports that he has been unable to eat or keep anything down.  She has generalized abdominal pain and tenderness.  She was sent over from the doctor's office for hypertension.  She has been normotensive the entire ER visit.  CT scan was negative for any new findings.  Labs within normal limits other than urine.  Patient has a yeast infection.  Instructed to keep follow-up appointment with Dr. Matias.  Sent Zofran in for nausea.  Sent Diflucan for her yeast infection.    Discussed patient with Dr. Bowen.    Abdominal pain, unspecified abdominal location: complicated acute illness or injury  S/P gastric sleeve procedure: complicated acute illness or injury  Yeast infection: acute illness or injury  Amount and/or Complexity of Data Reviewed  External Data Reviewed: labs and radiology.  Labs: ordered. Decision-making details documented in ED Course.  Radiology: ordered and independent interpretation performed. Decision-making details documented in ED Course.  ECG/medicine tests: ordered and  independent interpretation performed. Decision-making details documented in ED Course.      Risk  Prescription drug management.          Final diagnoses:   Abdominal pain, unspecified abdominal location   S/P gastric sleeve procedure   Yeast infection       ED Disposition  ED Disposition     ED Disposition   Discharge    Condition   Stable    Comment   --             Jena Mcclellan MD  [address]  [address]  [facility] [address]  [phone]      As needed, If symptoms worsen    Leonardo Matias MD  [address]  [place] [id number] [phone]      keep appointment on Wednesday         Medication List      New Prescriptions    fluconazole 150 MG tablet  Commonly known as: DIFLUCAN  Take 1 tablet by mouth Every 3 (Three) Days for 9 days.     ondansetron ODT 4 MG disintegrating tablet  Commonly known as: ZOFRAN-ODT  Place 1 tablet on the tongue Every 8 (Eight) Hours As Needed for Nausea or Vomiting for up to 3 days.           Where to Get Your Medications      These medications were sent to [id number] - [place], [place] - [address] AT [address] - [phone] [facility] [phone]   [address] [address]    Phone: [phone]   · fluconazole 150 MG tablet  · ondansetron ODT 4 MG disintegrating tablet          Risa Ramirez, APRN  05/24/23 2040

## 2023-09-29 ENCOUNTER — HOSPITAL ENCOUNTER (INPATIENT)
Facility: HOSPITAL | Age: 28
LOS: 1 days | Discharge: STILL ADMITTED OR ADMINISTRATIVE | DRG: 641 | End: 2023-09-30
Attending: EMERGENCY MEDICINE | Admitting: STUDENT IN AN ORGANIZED HEALTH CARE EDUCATION/TRAINING PROGRAM
Payer: COMMERCIAL

## 2023-09-29 ENCOUNTER — APPOINTMENT (OUTPATIENT)
Dept: CT IMAGING | Facility: HOSPITAL | Age: 28
DRG: 641 | End: 2023-09-29
Payer: COMMERCIAL

## 2023-09-29 DIAGNOSIS — E87.6 HYPOKALEMIA: Primary | ICD-10-CM

## 2023-09-29 DIAGNOSIS — Z98.84 HISTORY OF BARIATRIC SURGERY: ICD-10-CM

## 2023-09-29 DIAGNOSIS — E86.0 DEHYDRATION: ICD-10-CM

## 2023-09-29 DIAGNOSIS — R11.2 NAUSEA AND VOMITING, UNSPECIFIED VOMITING TYPE: ICD-10-CM

## 2023-09-29 PROBLEM — Z90.3 H/O GASTRIC SLEEVE: Status: ACTIVE | Noted: 2023-09-29

## 2023-09-29 PROBLEM — R07.0 THROAT DISCOMFORT: Status: ACTIVE | Noted: 2023-09-29

## 2023-09-29 LAB
ALBUMIN SERPL-MCNC: 3.9 G/DL (ref 3.5–5.2)
ALBUMIN/GLOB SERPL: 1.1 G/DL
ALP SERPL-CCNC: 70 U/L (ref 39–117)
ALT SERPL W P-5'-P-CCNC: 50 U/L (ref 1–33)
ANION GAP SERPL CALCULATED.3IONS-SCNC: 19 MMOL/L (ref 5–15)
AST SERPL-CCNC: 50 U/L (ref 1–32)
B PARAPERT DNA SPEC QL NAA+PROBE: NOT DETECTED
B PERT DNA SPEC QL NAA+PROBE: NOT DETECTED
BACTERIA UR QL AUTO: ABNORMAL /HPF
BASOPHILS # BLD AUTO: 0.04 10*3/MM3 (ref 0–0.2)
BASOPHILS NFR BLD AUTO: 0.4 % (ref 0–1.5)
BILIRUB SERPL-MCNC: 0.7 MG/DL (ref 0–1.2)
BILIRUB UR QL STRIP: NEGATIVE
BUN SERPL-MCNC: 12 MG/DL (ref 6–20)
BUN/CREAT SERPL: 11.8 (ref 7–25)
C PNEUM DNA NPH QL NAA+NON-PROBE: NOT DETECTED
CALCIUM SPEC-SCNC: 9.9 MG/DL (ref 8.6–10.5)
CHLORIDE SERPL-SCNC: 83 MMOL/L (ref 98–107)
CLARITY UR: CLEAR
CO2 SERPL-SCNC: 29 MMOL/L (ref 22–29)
COLOR UR: YELLOW
CREAT SERPL-MCNC: 1.02 MG/DL (ref 0.57–1)
D-LACTATE SERPL-SCNC: 2.5 MMOL/L (ref 0.5–2)
DEPRECATED RDW RBC AUTO: 41.1 FL (ref 37–54)
EGFRCR SERPLBLD CKD-EPI 2021: 78 ML/MIN/1.73
EOSINOPHIL # BLD AUTO: 0.01 10*3/MM3 (ref 0–0.4)
EOSINOPHIL NFR BLD AUTO: 0.1 % (ref 0.3–6.2)
ERYTHROCYTE [DISTWIDTH] IN BLOOD BY AUTOMATED COUNT: 13.2 % (ref 12.3–15.4)
FLUAV SUBTYP SPEC NAA+PROBE: NOT DETECTED
FLUBV RNA ISLT QL NAA+PROBE: NOT DETECTED
GLOBULIN UR ELPH-MCNC: 3.4 GM/DL
GLUCOSE SERPL-MCNC: 138 MG/DL (ref 65–99)
GLUCOSE UR STRIP-MCNC: NEGATIVE MG/DL
HADV DNA SPEC NAA+PROBE: NOT DETECTED
HCG SERPL QL: NEGATIVE
HCOV 229E RNA SPEC QL NAA+PROBE: NOT DETECTED
HCOV HKU1 RNA SPEC QL NAA+PROBE: NOT DETECTED
HCOV NL63 RNA SPEC QL NAA+PROBE: NOT DETECTED
HCOV OC43 RNA SPEC QL NAA+PROBE: NOT DETECTED
HCT VFR BLD AUTO: 48.1 % (ref 34–46.6)
HGB BLD-MCNC: 16.4 G/DL (ref 12–15.9)
HGB UR QL STRIP.AUTO: NEGATIVE
HMPV RNA NPH QL NAA+NON-PROBE: NOT DETECTED
HOLD SPECIMEN: NORMAL
HOLD SPECIMEN: NORMAL
HPIV1 RNA ISLT QL NAA+PROBE: NOT DETECTED
HPIV2 RNA SPEC QL NAA+PROBE: NOT DETECTED
HPIV3 RNA NPH QL NAA+PROBE: NOT DETECTED
HPIV4 P GENE NPH QL NAA+PROBE: NOT DETECTED
HYALINE CASTS UR QL AUTO: ABNORMAL /LPF
IMM GRANULOCYTES # BLD AUTO: 0.05 10*3/MM3 (ref 0–0.05)
IMM GRANULOCYTES NFR BLD AUTO: 0.5 % (ref 0–0.5)
KETONES UR QL STRIP: ABNORMAL
LEUKOCYTE ESTERASE UR QL STRIP.AUTO: NEGATIVE
LIPASE SERPL-CCNC: 9 U/L (ref 13–60)
LYMPHOCYTES # BLD AUTO: 2.34 10*3/MM3 (ref 0.7–3.1)
LYMPHOCYTES NFR BLD AUTO: 21.7 % (ref 19.6–45.3)
M PNEUMO IGG SER IA-ACNC: NOT DETECTED
MAGNESIUM SERPL-MCNC: 2 MG/DL (ref 1.6–2.6)
MCH RBC QN AUTO: 29.3 PG (ref 26.6–33)
MCHC RBC AUTO-ENTMCNC: 34.1 G/DL (ref 31.5–35.7)
MCV RBC AUTO: 86 FL (ref 79–97)
MONOCYTES # BLD AUTO: 1.03 10*3/MM3 (ref 0.1–0.9)
MONOCYTES NFR BLD AUTO: 9.5 % (ref 5–12)
NEUTROPHILS NFR BLD AUTO: 67.8 % (ref 42.7–76)
NEUTROPHILS NFR BLD AUTO: 7.32 10*3/MM3 (ref 1.7–7)
NITRITE UR QL STRIP: NEGATIVE
NRBC BLD AUTO-RTO: 0 /100 WBC (ref 0–0.2)
PH UR STRIP.AUTO: 6 [PH] (ref 5–9)
PLATELET # BLD AUTO: 276 10*3/MM3 (ref 140–450)
PMV BLD AUTO: 10.7 FL (ref 6–12)
POTASSIUM SERPL-SCNC: 2.4 MMOL/L (ref 3.5–5.2)
PROT SERPL-MCNC: 7.3 G/DL (ref 6–8.5)
PROT UR QL STRIP: ABNORMAL
QT INTERVAL: 488 MS
QTC INTERVAL: 644 MS
RBC # BLD AUTO: 5.59 10*6/MM3 (ref 3.77–5.28)
RBC # UR STRIP: ABNORMAL /HPF
REF LAB TEST METHOD: ABNORMAL
RHINOVIRUS RNA SPEC NAA+PROBE: DETECTED
RSV RNA NPH QL NAA+NON-PROBE: NOT DETECTED
SARS-COV-2 RNA NPH QL NAA+NON-PROBE: NOT DETECTED
SODIUM SERPL-SCNC: 131 MMOL/L (ref 136–145)
SP GR UR STRIP: 1.04 (ref 1–1.03)
SQUAMOUS #/AREA URNS HPF: ABNORMAL /HPF
T4 FREE SERPL-MCNC: 1.37 NG/DL (ref 0.93–1.7)
TSH SERPL DL<=0.05 MIU/L-ACNC: 4.25 UIU/ML (ref 0.27–4.2)
UROBILINOGEN UR QL STRIP: ABNORMAL
WBC # UR STRIP: ABNORMAL /HPF
WBC NRBC COR # BLD: 10.79 10*3/MM3 (ref 3.4–10.8)
WHOLE BLOOD HOLD COAG: NORMAL
WHOLE BLOOD HOLD SPECIMEN: NORMAL

## 2023-09-29 PROCEDURE — 0 POTASSIUM CHLORIDE 10 MEQ/100ML SOLUTION

## 2023-09-29 PROCEDURE — 87040 BLOOD CULTURE FOR BACTERIA: CPT | Performed by: EMERGENCY MEDICINE

## 2023-09-29 PROCEDURE — 83605 ASSAY OF LACTIC ACID: CPT | Performed by: EMERGENCY MEDICINE

## 2023-09-29 PROCEDURE — 36415 COLL VENOUS BLD VENIPUNCTURE: CPT | Performed by: EMERGENCY MEDICINE

## 2023-09-29 PROCEDURE — 74160 CT ABDOMEN W/CONTRAST: CPT

## 2023-09-29 PROCEDURE — 25010000002 ENOXAPARIN PER 10 MG

## 2023-09-29 PROCEDURE — 0 POTASSIUM CHLORIDE 10 MEQ/100ML SOLUTION: Performed by: EMERGENCY MEDICINE

## 2023-09-29 PROCEDURE — 80050 GENERAL HEALTH PANEL: CPT

## 2023-09-29 PROCEDURE — 93005 ELECTROCARDIOGRAM TRACING: CPT

## 2023-09-29 PROCEDURE — 83735 ASSAY OF MAGNESIUM: CPT | Performed by: EMERGENCY MEDICINE

## 2023-09-29 PROCEDURE — 84703 CHORIONIC GONADOTROPIN ASSAY: CPT | Performed by: EMERGENCY MEDICINE

## 2023-09-29 PROCEDURE — 99222 1ST HOSP IP/OBS MODERATE 55: CPT

## 2023-09-29 PROCEDURE — 84439 ASSAY OF FREE THYROXINE: CPT | Performed by: EMERGENCY MEDICINE

## 2023-09-29 PROCEDURE — 25010000002 ONDANSETRON PER 1 MG: Performed by: EMERGENCY MEDICINE

## 2023-09-29 PROCEDURE — 93010 ELECTROCARDIOGRAM REPORT: CPT | Performed by: INTERNAL MEDICINE

## 2023-09-29 PROCEDURE — 71275 CT ANGIOGRAPHY CHEST: CPT

## 2023-09-29 PROCEDURE — 0202U NFCT DS 22 TRGT SARS-COV-2: CPT | Performed by: EMERGENCY MEDICINE

## 2023-09-29 PROCEDURE — 93005 ELECTROCARDIOGRAM TRACING: CPT | Performed by: EMERGENCY MEDICINE

## 2023-09-29 PROCEDURE — 25510000001 IOPAMIDOL PER 1 ML: Performed by: EMERGENCY MEDICINE

## 2023-09-29 PROCEDURE — 83690 ASSAY OF LIPASE: CPT | Performed by: EMERGENCY MEDICINE

## 2023-09-29 PROCEDURE — 99285 EMERGENCY DEPT VISIT HI MDM: CPT

## 2023-09-29 PROCEDURE — 81001 URINALYSIS AUTO W/SCOPE: CPT | Performed by: EMERGENCY MEDICINE

## 2023-09-29 RX ORDER — PANTOPRAZOLE SODIUM 40 MG/10ML
40 INJECTION, POWDER, LYOPHILIZED, FOR SOLUTION INTRAVENOUS ONCE
Status: COMPLETED | OUTPATIENT
Start: 2023-09-29 | End: 2023-09-29

## 2023-09-29 RX ORDER — SODIUM CHLORIDE 0.9 % (FLUSH) 0.9 %
10 SYRINGE (ML) INJECTION EVERY 12 HOURS SCHEDULED
Status: DISCONTINUED | OUTPATIENT
Start: 2023-09-29 | End: 2023-10-01 | Stop reason: HOSPADM

## 2023-09-29 RX ORDER — POTASSIUM CHLORIDE 750 MG/1
40 CAPSULE, EXTENDED RELEASE ORAL ONCE
Status: COMPLETED | OUTPATIENT
Start: 2023-09-29 | End: 2023-09-29

## 2023-09-29 RX ORDER — AMOXICILLIN 250 MG
2 CAPSULE ORAL 2 TIMES DAILY PRN
Status: DISCONTINUED | OUTPATIENT
Start: 2023-09-29 | End: 2023-10-01 | Stop reason: HOSPADM

## 2023-09-29 RX ORDER — POTASSIUM CHLORIDE 7.45 MG/ML
10 INJECTION INTRAVENOUS ONCE
Status: COMPLETED | OUTPATIENT
Start: 2023-09-29 | End: 2023-09-29

## 2023-09-29 RX ORDER — BISACODYL 5 MG/1
5 TABLET, DELAYED RELEASE ORAL DAILY PRN
Status: DISCONTINUED | OUTPATIENT
Start: 2023-09-29 | End: 2023-10-01 | Stop reason: HOSPADM

## 2023-09-29 RX ORDER — CHOLECALCIFEROL (VITAMIN D3) 125 MCG
5 CAPSULE ORAL NIGHTLY PRN
Status: DISCONTINUED | OUTPATIENT
Start: 2023-09-29 | End: 2023-10-01 | Stop reason: HOSPADM

## 2023-09-29 RX ORDER — ONDANSETRON 2 MG/ML
4 INJECTION INTRAMUSCULAR; INTRAVENOUS EVERY 6 HOURS PRN
Status: DISCONTINUED | OUTPATIENT
Start: 2023-09-29 | End: 2023-09-30

## 2023-09-29 RX ORDER — DIPHENHYDRAMINE HYDROCHLORIDE AND LIDOCAINE HYDROCHLORIDE AND ALUMINUM HYDROXIDE AND MAGNESIUM HYDRO
5 KIT EVERY 6 HOURS
Status: DISCONTINUED | OUTPATIENT
Start: 2023-09-29 | End: 2023-10-01 | Stop reason: HOSPADM

## 2023-09-29 RX ORDER — POLYETHYLENE GLYCOL 3350 17 G/17G
17 POWDER, FOR SOLUTION ORAL DAILY PRN
Status: DISCONTINUED | OUTPATIENT
Start: 2023-09-29 | End: 2023-10-01 | Stop reason: HOSPADM

## 2023-09-29 RX ORDER — SODIUM CHLORIDE 9 MG/ML
40 INJECTION, SOLUTION INTRAVENOUS AS NEEDED
Status: DISCONTINUED | OUTPATIENT
Start: 2023-09-29 | End: 2023-10-01 | Stop reason: HOSPADM

## 2023-09-29 RX ORDER — ONDANSETRON 2 MG/ML
4 INJECTION INTRAMUSCULAR; INTRAVENOUS ONCE
Status: COMPLETED | OUTPATIENT
Start: 2023-09-29 | End: 2023-09-29

## 2023-09-29 RX ORDER — POTASSIUM CHLORIDE 7.45 MG/ML
10 INJECTION INTRAVENOUS
Status: DISPENSED | OUTPATIENT
Start: 2023-09-29 | End: 2023-09-30

## 2023-09-29 RX ORDER — SODIUM CHLORIDE 0.9 % (FLUSH) 0.9 %
10 SYRINGE (ML) INJECTION AS NEEDED
Status: DISCONTINUED | OUTPATIENT
Start: 2023-09-29 | End: 2023-10-01 | Stop reason: HOSPADM

## 2023-09-29 RX ORDER — ACETAMINOPHEN 325 MG/1
650 TABLET ORAL EVERY 4 HOURS PRN
Status: DISCONTINUED | OUTPATIENT
Start: 2023-09-29 | End: 2023-10-01 | Stop reason: HOSPADM

## 2023-09-29 RX ORDER — SODIUM CHLORIDE 9 MG/ML
100 INJECTION, SOLUTION INTRAVENOUS CONTINUOUS
Status: DISCONTINUED | OUTPATIENT
Start: 2023-09-29 | End: 2023-09-30

## 2023-09-29 RX ORDER — ENOXAPARIN SODIUM 100 MG/ML
40 INJECTION SUBCUTANEOUS DAILY
Status: DISCONTINUED | OUTPATIENT
Start: 2023-09-29 | End: 2023-10-01 | Stop reason: HOSPADM

## 2023-09-29 RX ORDER — BISACODYL 10 MG
10 SUPPOSITORY, RECTAL RECTAL DAILY PRN
Status: DISCONTINUED | OUTPATIENT
Start: 2023-09-29 | End: 2023-10-01 | Stop reason: HOSPADM

## 2023-09-29 RX ORDER — NITROGLYCERIN 0.4 MG/1
0.4 TABLET SUBLINGUAL
Status: DISCONTINUED | OUTPATIENT
Start: 2023-09-29 | End: 2023-10-01 | Stop reason: HOSPADM

## 2023-09-29 RX ORDER — FAMOTIDINE 40 MG/1
40 TABLET, FILM COATED ORAL DAILY
Status: DISCONTINUED | OUTPATIENT
Start: 2023-09-30 | End: 2023-10-01 | Stop reason: HOSPADM

## 2023-09-29 RX ADMIN — POTASSIUM CHLORIDE 10 MEQ: 7.46 INJECTION, SOLUTION INTRAVENOUS at 22:28

## 2023-09-29 RX ADMIN — POTASSIUM CHLORIDE 40 MEQ: 750 CAPSULE, EXTENDED RELEASE ORAL at 20:49

## 2023-09-29 RX ADMIN — POTASSIUM CHLORIDE 10 MEQ: 7.46 INJECTION, SOLUTION INTRAVENOUS at 19:41

## 2023-09-29 RX ADMIN — IOPAMIDOL 75 ML: 755 INJECTION, SOLUTION INTRAVENOUS at 18:05

## 2023-09-29 RX ADMIN — SODIUM CHLORIDE 2000 ML: 9 INJECTION, SOLUTION INTRAVENOUS at 16:23

## 2023-09-29 RX ADMIN — POTASSIUM CHLORIDE 10 MEQ: 7.46 INJECTION, SOLUTION INTRAVENOUS at 20:40

## 2023-09-29 RX ADMIN — SODIUM CHLORIDE 100 ML/HR: 9 INJECTION, SOLUTION INTRAVENOUS at 20:40

## 2023-09-29 RX ADMIN — ONDANSETRON 4 MG: 2 INJECTION INTRAMUSCULAR; INTRAVENOUS at 16:23

## 2023-09-29 RX ADMIN — POTASSIUM CHLORIDE 10 MEQ: 7.46 INJECTION, SOLUTION INTRAVENOUS at 21:47

## 2023-09-29 RX ADMIN — ENOXAPARIN SODIUM 40 MG: 40 INJECTION SUBCUTANEOUS at 20:47

## 2023-09-29 RX ADMIN — DIPHENHYDRAMINE HYDROCHLORIDE AND LIDOCAINE HYDROCHLORIDE AND ALUMINUM HYDROXIDE AND MAGNESIUM HYDRO 5 ML: KIT at 22:28

## 2023-09-29 RX ADMIN — PANTOPRAZOLE SODIUM 40 MG: 40 INJECTION, POWDER, FOR SOLUTION INTRAVENOUS at 16:23

## 2023-09-29 RX ADMIN — POTASSIUM CHLORIDE 10 MEQ: 7.46 INJECTION, SOLUTION INTRAVENOUS at 16:23

## 2023-09-29 NOTE — Clinical Note
Level of Care: Telemetry [5]   Diagnosis: Hypokalemia [474536]   Admitting Physician: MATI GILLILAND [[id number]]   Attending Physician: MATI GILLILAND [[id number]]   Certification: I Certify That Inpatient Hospital Services Are Medically Necessary For Greater Than 2 Midnights

## 2023-09-30 ENCOUNTER — APPOINTMENT (OUTPATIENT)
Dept: GENERAL RADIOLOGY | Facility: HOSPITAL | Age: 28
DRG: 641 | End: 2023-09-30
Payer: COMMERCIAL

## 2023-09-30 VITALS
HEIGHT: 68 IN | WEIGHT: 142.4 LBS | OXYGEN SATURATION: 99 % | BODY MASS INDEX: 21.58 KG/M2 | HEART RATE: 85 BPM | RESPIRATION RATE: 18 BRPM | SYSTOLIC BLOOD PRESSURE: 116 MMHG | TEMPERATURE: 98.6 F | DIASTOLIC BLOOD PRESSURE: 70 MMHG

## 2023-09-30 PROBLEM — R62.7 FAILURE TO THRIVE IN ADULT: Status: ACTIVE | Noted: 2023-09-30

## 2023-09-30 PROBLEM — E43 SEVERE MALNUTRITION: Status: ACTIVE | Noted: 2023-09-30

## 2023-09-30 PROBLEM — E87.6 HYPOKALEMIA: Status: RESOLVED | Noted: 2023-09-29 | Resolved: 2023-09-30

## 2023-09-30 PROBLEM — E86.0 DEHYDRATION: Status: RESOLVED | Noted: 2023-09-29 | Resolved: 2023-09-30

## 2023-09-30 PROBLEM — R11.2 NAUSEA & VOMITING: Status: RESOLVED | Noted: 2023-09-29 | Resolved: 2023-09-30

## 2023-09-30 PROBLEM — R74.01 TRANSAMINITIS: Status: ACTIVE | Noted: 2023-09-30

## 2023-09-30 PROBLEM — B37.0 ORAL CANDIDA: Status: ACTIVE | Noted: 2023-09-30

## 2023-09-30 LAB
ALBUMIN SERPL-MCNC: 3.1 G/DL (ref 3.5–5.2)
ALBUMIN/GLOB SERPL: 1.2 G/DL
ALP SERPL-CCNC: 53 U/L (ref 39–117)
ALT SERPL W P-5'-P-CCNC: 35 U/L (ref 1–33)
ANION GAP SERPL CALCULATED.3IONS-SCNC: 13 MMOL/L (ref 5–15)
ANISOCYTOSIS BLD QL: NORMAL
AST SERPL-CCNC: 38 U/L (ref 1–32)
BASOPHILS # BLD AUTO: 0.05 10*3/MM3 (ref 0–0.2)
BASOPHILS NFR BLD AUTO: 0.4 % (ref 0–1.5)
BILIRUB SERPL-MCNC: 0.6 MG/DL (ref 0–1.2)
BUN SERPL-MCNC: 11 MG/DL (ref 6–20)
BUN/CREAT SERPL: 15.9 (ref 7–25)
CALCIUM SPEC-SCNC: 8.9 MG/DL (ref 8.6–10.5)
CHLORIDE SERPL-SCNC: 94 MMOL/L (ref 98–107)
CO2 SERPL-SCNC: 27 MMOL/L (ref 22–29)
CREAT SERPL-MCNC: 0.69 MG/DL (ref 0.57–1)
D-LACTATE SERPL-SCNC: 1.7 MMOL/L (ref 0.5–2)
DEPRECATED RDW RBC AUTO: 41.1 FL (ref 37–54)
EGFRCR SERPLBLD CKD-EPI 2021: 122.9 ML/MIN/1.73
EOSINOPHIL # BLD AUTO: 0.11 10*3/MM3 (ref 0–0.4)
EOSINOPHIL NFR BLD AUTO: 0.9 % (ref 0.3–6.2)
ERYTHROCYTE [DISTWIDTH] IN BLOOD BY AUTOMATED COUNT: 13.2 % (ref 12.3–15.4)
GLOBULIN UR ELPH-MCNC: 2.5 GM/DL
GLUCOSE SERPL-MCNC: 77 MG/DL (ref 65–99)
HCT VFR BLD AUTO: 41.2 % (ref 34–46.6)
HGB BLD-MCNC: 14.2 G/DL (ref 12–15.9)
IMM GRANULOCYTES # BLD AUTO: 0.06 10*3/MM3 (ref 0–0.05)
IMM GRANULOCYTES NFR BLD AUTO: 0.5 % (ref 0–0.5)
LARGE PLATELETS: NORMAL
LYMPHOCYTES # BLD AUTO: 4.1 10*3/MM3 (ref 0.7–3.1)
LYMPHOCYTES NFR BLD AUTO: 35.3 % (ref 19.6–45.3)
MCH RBC QN AUTO: 29.3 PG (ref 26.6–33)
MCHC RBC AUTO-ENTMCNC: 34.5 G/DL (ref 31.5–35.7)
MCV RBC AUTO: 85.1 FL (ref 79–97)
MONOCYTES # BLD AUTO: 1.39 10*3/MM3 (ref 0.1–0.9)
MONOCYTES NFR BLD AUTO: 12 % (ref 5–12)
NEUTROPHILS NFR BLD AUTO: 5.9 10*3/MM3 (ref 1.7–7)
NEUTROPHILS NFR BLD AUTO: 50.9 % (ref 42.7–76)
NRBC BLD AUTO-RTO: 0 /100 WBC (ref 0–0.2)
PLATELET # BLD AUTO: 184 10*3/MM3 (ref 140–450)
PMV BLD AUTO: 12 FL (ref 6–12)
POTASSIUM SERPL-SCNC: 3.9 MMOL/L (ref 3.5–5.2)
PROT SERPL-MCNC: 5.6 G/DL (ref 6–8.5)
QT INTERVAL: 442 MS
QTC INTERVAL: 490 MS
RBC # BLD AUTO: 4.84 10*6/MM3 (ref 3.77–5.28)
SMALL PLATELETS BLD QL SMEAR: ADEQUATE
SODIUM SERPL-SCNC: 134 MMOL/L (ref 136–145)
WBC MORPH BLD: NORMAL
WBC NRBC COR # BLD: 11.61 10*3/MM3 (ref 3.4–10.8)

## 2023-09-30 PROCEDURE — 85007 BL SMEAR W/DIFF WBC COUNT: CPT

## 2023-09-30 PROCEDURE — 63710000001 DRONABINOL PER 2.5 MG: Performed by: STUDENT IN AN ORGANIZED HEALTH CARE EDUCATION/TRAINING PROGRAM

## 2023-09-30 PROCEDURE — 99232 SBSQ HOSP IP/OBS MODERATE 35: CPT | Performed by: STUDENT IN AN ORGANIZED HEALTH CARE EDUCATION/TRAINING PROGRAM

## 2023-09-30 PROCEDURE — 93005 ELECTROCARDIOGRAM TRACING: CPT | Performed by: STUDENT IN AN ORGANIZED HEALTH CARE EDUCATION/TRAINING PROGRAM

## 2023-09-30 PROCEDURE — 87040 BLOOD CULTURE FOR BACTERIA: CPT | Performed by: STUDENT IN AN ORGANIZED HEALTH CARE EDUCATION/TRAINING PROGRAM

## 2023-09-30 PROCEDURE — 97161 PT EVAL LOW COMPLEX 20 MIN: CPT | Performed by: PHYSICAL THERAPIST

## 2023-09-30 PROCEDURE — 85025 COMPLETE CBC W/AUTO DIFF WBC: CPT

## 2023-09-30 PROCEDURE — 93010 ELECTROCARDIOGRAM REPORT: CPT | Performed by: INTERNAL MEDICINE

## 2023-09-30 PROCEDURE — 80053 COMPREHEN METABOLIC PANEL: CPT

## 2023-09-30 RX ORDER — DRONABINOL 2.5 MG/1
2.5 CAPSULE ORAL 2 TIMES DAILY
Status: DISCONTINUED | OUTPATIENT
Start: 2023-09-30 | End: 2023-10-01 | Stop reason: HOSPADM

## 2023-09-30 RX ORDER — POTASSIUM CHLORIDE 750 MG/1
20 CAPSULE, EXTENDED RELEASE ORAL ONCE
Status: COMPLETED | OUTPATIENT
Start: 2023-09-30 | End: 2023-09-30

## 2023-09-30 RX ORDER — LIDOCAINE HYDROCHLORIDE 20 MG/ML
5 SOLUTION OROPHARYNGEAL
Status: DISCONTINUED | OUTPATIENT
Start: 2023-09-30 | End: 2023-09-30

## 2023-09-30 RX ORDER — SODIUM CHLORIDE, SODIUM LACTATE, POTASSIUM CHLORIDE, CALCIUM CHLORIDE 600; 310; 30; 20 MG/100ML; MG/100ML; MG/100ML; MG/100ML
75 INJECTION, SOLUTION INTRAVENOUS CONTINUOUS
Status: DISCONTINUED | OUTPATIENT
Start: 2023-09-30 | End: 2023-10-01 | Stop reason: HOSPADM

## 2023-09-30 RX ORDER — DIPHENHYDRAMINE HYDROCHLORIDE AND LIDOCAINE HYDROCHLORIDE AND ALUMINUM HYDROXIDE AND MAGNESIUM HYDRO
5 KIT EVERY 6 HOURS
Qty: 119 ML | Refills: 0 | OUTPATIENT
Start: 2023-09-30

## 2023-09-30 RX ORDER — FLUCONAZOLE 100 MG/1
100 TABLET ORAL DAILY
Status: DISCONTINUED | OUTPATIENT
Start: 2023-10-01 | End: 2023-10-01 | Stop reason: HOSPADM

## 2023-09-30 RX ORDER — FLUCONAZOLE 100 MG/1
200 TABLET ORAL ONCE
Status: COMPLETED | OUTPATIENT
Start: 2023-09-30 | End: 2023-09-30

## 2023-09-30 RX ADMIN — Medication 10 ML: at 20:39

## 2023-09-30 RX ADMIN — FAMOTIDINE 40 MG: 40 TABLET, FILM COATED ORAL at 09:44

## 2023-09-30 RX ADMIN — DIPHENHYDRAMINE HYDROCHLORIDE AND LIDOCAINE HYDROCHLORIDE AND ALUMINUM HYDROXIDE AND MAGNESIUM HYDRO 5 ML: KIT at 05:09

## 2023-09-30 RX ADMIN — DIPHENHYDRAMINE HYDROCHLORIDE AND LIDOCAINE HYDROCHLORIDE AND ALUMINUM HYDROXIDE AND MAGNESIUM HYDRO 5 ML: KIT at 22:45

## 2023-09-30 RX ADMIN — DIPHENHYDRAMINE HYDROCHLORIDE AND LIDOCAINE HYDROCHLORIDE AND ALUMINUM HYDROXIDE AND MAGNESIUM HYDRO 5 ML: KIT at 16:59

## 2023-09-30 RX ADMIN — POTASSIUM CHLORIDE 20 MEQ: 750 CAPSULE, EXTENDED RELEASE ORAL at 00:45

## 2023-09-30 RX ADMIN — DIPHENHYDRAMINE HYDROCHLORIDE AND LIDOCAINE HYDROCHLORIDE AND ALUMINUM HYDROXIDE AND MAGNESIUM HYDRO 5 ML: KIT at 11:11

## 2023-09-30 RX ADMIN — FLUCONAZOLE 200 MG: 100 TABLET ORAL at 20:41

## 2023-09-30 RX ADMIN — SODIUM CHLORIDE, POTASSIUM CHLORIDE, SODIUM LACTATE AND CALCIUM CHLORIDE 75 ML/HR: 600; 310; 30; 20 INJECTION, SOLUTION INTRAVENOUS at 20:39

## 2023-09-30 RX ADMIN — DRONABINOL 2.5 MG: 2.5 CAPSULE ORAL at 20:41

## 2023-09-30 NOTE — PLAN OF CARE
Goal Outcome Evaluation:  Plan of Care Reviewed With: patient           Outcome Evaluation: PT evaluation completed. Patient presented independent with all mobility tasks: bed mobility, transfers, gait w/out an AD. Scored a perfect 28/28 on the Tinetti balance test demonstrating a very low fall risk. No deficits noted besides B hip flexor weakness with strength testing. Nursing notified of findings and determined not an appropriate candidate for PT at this time. PT eval only. D/c'd from skilled PT with no follow up required at this time. Pt educated on need to strengthen weak hip flexors with straight leg raise exercise. Recommend d/c home with 2 dependent children.      Anticipated Discharge Disposition (PT): home

## 2023-09-30 NOTE — PROGRESS NOTES
FAMILY MEDICINE RESIDENCY SERVICE  DAILY PROGRESS NOTE    NAME: Tanika Molina  : 1995  MRN: [id number]      LOS: 1 day     PROVIDER OF SERVICE: Leonides Hernandez MD    Chief Complaint: Dehydration    Subjective:     Interval History:  History taken from: patient chart  Patient reports nausea and vomiting have resolved. Abdominal pain has improved. Reports continued mouth pain and soreness but this is helped with magic mouthwash. Lost IV access overnight but able to tolerate po replacement of potassium.     Review of Systems:   Review of Systems   Constitutional:  Negative for fever.   Respiratory:  Negative for shortness of breath.    Cardiovascular:  Negative for chest pain.   Gastrointestinal:  Positive for abdominal pain (improved). Negative for constipation, diarrhea, nausea and vomiting.   Genitourinary:  Negative for dysuria.   Neurological:  Positive for weakness (impoving) and light-headedness (improving). Negative for dizziness.     Objective:     Vital Signs  Temp:  [97.6 °F (36.4 °C)-98.2 °F (36.8 °C)] 98.2 °F (36.8 °C)  Heart Rate:  [] 63  Resp:  [20] 20  BP: ()/(62-88) 101/64   Body mass index is 21.65 kg/m².    Physical Exam  Physical Exam  Constitutional:       Appearance: She is ill-appearing.   HENT:      Head: Normocephalic.   Eyes:      Extraocular Movements: Extraocular movements intact.      Conjunctiva/sclera: Conjunctivae normal.      Pupils: Pupils are equal, round, and reactive to light.   Cardiovascular:      Rate and Rhythm: Normal rate and regular rhythm.      Pulses: Normal pulses.      Heart sounds: Normal heart sounds.   Pulmonary:      Effort: Pulmonary effort is normal.      Breath sounds: Normal breath sounds.   Abdominal:      General: Abdomen is flat. Bowel sounds are normal.      Palpations: Abdomen is soft.      Tenderness: There is generalized abdominal tenderness. There is no guarding.   Musculoskeletal:         General: Normal range of  motion.      Cervical back: Normal range of motion and neck supple.      Right lower leg: No edema.      Left lower leg: No edema.   Skin:     General: Skin is warm.      Capillary Refill: Capillary refill takes less than 2 seconds.   Neurological:      Mental Status: She is alert and oriented to person, place, and time.      Motor: Weakness present.      Gait: Gait normal.      Comments: Generalized weakness   Psychiatric:         Mood and Affect: Mood normal.         Behavior: Behavior normal.         Thought Content: Thought content normal.         Judgment: Judgment normal.     Scheduled Meds   enoxaparin, 40 mg, Subcutaneous, Daily  famotidine, 40 mg, Oral, Daily  First Mouthwash (Magic Mouthwash), 5 mL, Swish & Spit, Q6H  sodium chloride, 10 mL, Intravenous, Q12H       PRN Meds     acetaminophen    senna-docusate sodium **AND** polyethylene glycol **AND** bisacodyl **AND** bisacodyl    melatonin    nitroglycerin    ondansetron    phenol    sodium chloride    sodium chloride    sodium chloride      Diagnostic Data    Lab Results (last 24 hours)       Procedure Component Value Units Date/Time    CBC & Differential [[id number]]  (Abnormal) Collected: 09/30/23 0634    Specimen: Blood Updated: 09/30/23 0823    Narrative:      The following orders were created for panel order CBC & Differential.  Procedure                               Abnormality         Status                     ---------                               -----------         ------                     CBC Auto Differential[[id number]]        Abnormal            Final result               Scan Slide[[id number]]                                       Final result                 Please view results for these tests on the individual orders.    Scan Slide [[id number]] Collected: 09/30/23 0634    Specimen: Blood Updated: 09/30/23 0823     Anisocytosis Slight/1+     WBC Morphology Normal     Platelet Estimate Adequate     Large Platelets Slight/1+    CBC Auto  Differential [[id number]]  (Abnormal) Collected: 09/30/23 0634    Specimen: Blood Updated: 09/30/23 0704     WBC 11.61 10*3/mm3      RBC 4.84 10*6/mm3      Hemoglobin 14.2 g/dL      Hematocrit 41.2 %      MCV 85.1 fL      MCH 29.3 pg      MCHC 34.5 g/dL      RDW 13.2 %      RDW-SD 41.1 fl      MPV 12.0 fL      Platelets 184 10*3/mm3      Neutrophil % 50.9 %      Lymphocyte % 35.3 %      Monocyte % 12.0 %      Eosinophil % 0.9 %      Basophil % 0.4 %      Immature Grans % 0.5 %      Neutrophils, Absolute 5.90 10*3/mm3      Lymphocytes, Absolute 4.10 10*3/mm3      Monocytes, Absolute 1.39 10*3/mm3      Eosinophils, Absolute 0.11 10*3/mm3      Basophils, Absolute 0.05 10*3/mm3      Immature Grans, Absolute 0.06 10*3/mm3      nRBC 0.0 /100 WBC     Comprehensive Metabolic Panel [[id number]]  (Abnormal) Collected: 09/30/23 0634    Specimen: Blood Updated: 09/30/23 0702     Glucose 77 mg/dL      BUN 11 mg/dL      Creatinine 0.69 mg/dL      Sodium 134 mmol/L      Potassium 3.9 mmol/L      Comment: Slight hemolysis detected by analyzer. Results may be affected.        Chloride 94 mmol/L      CO2 27.0 mmol/L      Calcium 8.9 mg/dL      Total Protein 5.6 g/dL      Albumin 3.1 g/dL      ALT (SGPT) 35 U/L      AST (SGOT) 38 U/L      Comment: Slight hemolysis detected by analyzer. Results may be affected.        Alkaline Phosphatase 53 U/L      Total Bilirubin 0.6 mg/dL      Globulin 2.5 gm/dL      A/G Ratio 1.2 g/dL      BUN/Creatinine Ratio 15.9     Anion Gap 13.0 mmol/L      eGFR 122.9 mL/min/1.73     Narrative:      GFR Normal >60  Chronic Kidney Disease <60  Kidney Failure <15      Blood Culture - Blood, Hand, Right [[id number]] Collected: 09/30/23 0634    Specimen: Blood from Hand, Right Updated: 09/30/23 0638    STAT Lactic Acid, Reflex [[id number]]  (Normal) Collected: 09/29/23 2356    Specimen: Blood Updated: 09/30/23 0029     Lactate 1.7 mmol/L     Urinalysis, Microscopic Only - Urine, Clean Catch [[id number]]  (Abnormal)  Collected: 09/29/23 1925    Specimen: Urine, Clean Catch Updated: 09/29/23 1947     RBC, UA None Seen /HPF      WBC, UA 3-5 /HPF      Bacteria, UA Trace /HPF      Squamous Epithelial Cells, UA 3-5 /HPF      Hyaline Casts, UA None Seen /LPF      Methodology Manual Light Microscopy    Urinalysis With Microscopic If Indicated (No Culture) - Urine, Clean Catch [[id number]]  (Abnormal) Collected: 09/29/23 1925    Specimen: Urine, Clean Catch Updated: 09/29/23 1941     Color, UA Yellow     Appearance, UA Clear     pH, UA 6.0     Specific Gravity, UA 1.037     Comment: Result obtained by Refractometer        Glucose, UA Negative     Ketones, UA Trace     Bilirubin, UA Negative     Blood, UA Negative     Protein, UA 30 mg/dL (1+)     Leuk Esterase, UA Negative     Nitrite, UA Negative     Urobilinogen, UA 1.0 E.U./dL    Respiratory Panel PCR w/COVID-19(SARS-CoV-2) [facility] In-House, NP Swab in UTM/VTM, 3-4 HR TAT - Swab, Nasopharynx [[id number]]  (Abnormal) Collected: 09/29/23 1818    Specimen: Swab from Nasopharynx Updated: 09/29/23 1918     ADENOVIRUS, PCR Not Detected     Coronavirus 229E Not Detected     Coronavirus HKU1 Not Detected     Coronavirus NL63 Not Detected     Coronavirus OC43 Not Detected     COVID19 Not Detected     Human Metapneumovirus Not Detected     Human Rhinovirus/Enterovirus Detected     Influenza A PCR Not Detected     Influenza B PCR Not Detected     Parainfluenza Virus 1 Not Detected     Parainfluenza Virus 2 Not Detected     Parainfluenza Virus 3 Not Detected     Parainfluenza Virus 4 Not Detected     RSV, PCR Not Detected     Bordetella pertussis pcr Not Detected     Bordetella parapertussis PCR Not Detected     Chlamydophila pneumoniae PCR Not Detected     Mycoplasma pneumo by PCR Not Detected    Narrative:      In the setting of a positive respiratory panel with a viral infection PLUS a negative procalcitonin without other underlying concern for bacterial infection, consider  observing off antibiotics or discontinuation of antibiotics and continue supportive care. If the respiratory panel is positive for atypical bacterial infection (Bordetella pertussis, Chlamydophila pneumoniae, or Mycoplasma pneumoniae), consider antibiotic de-escalation to target atypical bacterial infection.    Blood Culture - Blood, Arm, Left [[id number]] Collected: 09/29/23 1839    Specimen: Blood from Arm, Left Updated: 09/29/23 1839    Lactic Acid, Plasma [[id number]]  (Abnormal) Collected: 09/29/23 1744    Specimen: Blood Updated: 09/29/23 1811     Lactate 2.5 mmol/L     TSH [[id number]]  (Abnormal) Collected: 09/29/23 1342    Specimen: Blood Updated: 09/29/23 1648     TSH 4.250 uIU/mL     T4, Free [[id number]]  (Normal) Collected: 09/29/23 1342    Specimen: Blood Updated: 09/29/23 1648     Free T4 1.37 ng/dL     Narrative:      Results may be falsely increased if patient taking Biotin.      Lipase [[id number]]  (Abnormal) Collected: 09/29/23 1342    Specimen: Blood Updated: 09/29/23 1636     Lipase 9 U/L     Magnesium [[id number]]  (Normal) Collected: 09/29/23 1342    Specimen: Blood Updated: 09/29/23 1636     Magnesium 2.0 mg/dL     hCG, Serum, Qualitative [[id number]]  (Normal) Collected: 09/29/23 1342    Specimen: Blood Updated: 09/29/23 1609     HCG Qualitative Negative    [place] Draw [[id number]] Collected: 09/29/23 1342    Specimen: Blood Updated: 09/29/23 1445    Narrative:      The following orders were created for panel order [place] Draw.  Procedure                               Abnormality         Status                     ---------                               -----------         ------                     Green Top (Gel)[[id number]]                                  Final result               Lavender Top[[id number]]                                     Final result               Gold Top - SST[[id number]]                                   Final result               Light Blue Top[[id number]]                                    Final result                 Please view results for these tests on the individual orders.    Green Top (Gel) [[id number]] Collected: 09/29/23 1342    Specimen: Blood Updated: 09/29/23 1445     Extra Tube Hold for add-ons.     Comment: Auto resulted.       Lavender Top [[id number]] Collected: 09/29/23 1342    Specimen: Blood Updated: 09/29/23 1445     Extra Tube hold for add-on     Comment: Auto resulted       Gold Top - SST [[id number]] Collected: 09/29/23 1342    Specimen: Blood Updated: 09/29/23 1445     Extra Tube Hold for add-ons.     Comment: Auto resulted.       Light Blue Top [[id number]] Collected: 09/29/23 1342    Specimen: Blood Updated: 09/29/23 1445     Extra Tube Hold for add-ons.     Comment: Auto resulted       Comprehensive Metabolic Panel [[id number]]  (Abnormal) Collected: 09/29/23 1342    Specimen: Blood Updated: 09/29/23 1419     Glucose 138 mg/dL      BUN 12 mg/dL      Creatinine 1.02 mg/dL      Sodium 131 mmol/L      Potassium 2.4 mmol/L      Comment: Slight hemolysis detected by analyzer. Results may be affected.        Chloride 83 mmol/L      CO2 29.0 mmol/L      Calcium 9.9 mg/dL      Total Protein 7.3 g/dL      Albumin 3.9 g/dL      ALT (SGPT) 50 U/L      AST (SGOT) 50 U/L      Alkaline Phosphatase 70 U/L      Total Bilirubin 0.7 mg/dL      Globulin 3.4 gm/dL      A/G Ratio 1.1 g/dL      BUN/Creatinine Ratio 11.8     Anion Gap 19.0 mmol/L      eGFR 78.0 mL/min/1.73     Narrative:      GFR Normal >60  Chronic Kidney Disease <60  Kidney Failure <15      CBC & Differential [[id number]]  (Abnormal) Collected: 09/29/23 1342    Specimen: Blood Updated: 09/29/23 1356    Narrative:      The following orders were created for panel order CBC & Differential.  Procedure                               Abnormality         Status                     ---------                               -----------         ------                     CBC Auto Differential[[id number]]        Abnormal             Final result                 Please view results for these tests on the individual orders.    CBC Auto Differential [[id number]]  (Abnormal) Collected: 09/29/23 1342    Specimen: Blood Updated: 09/29/23 1356     WBC 10.79 10*3/mm3      RBC 5.59 10*6/mm3      Hemoglobin 16.4 g/dL      Hematocrit 48.1 %      MCV 86.0 fL      MCH 29.3 pg      MCHC 34.1 g/dL      RDW 13.2 %      RDW-SD 41.1 fl      MPV 10.7 fL      Platelets 276 10*3/mm3      Neutrophil % 67.8 %      Lymphocyte % 21.7 %      Monocyte % 9.5 %      Eosinophil % 0.1 %      Basophil % 0.4 %      Immature Grans % 0.5 %      Neutrophils, Absolute 7.32 10*3/mm3      Lymphocytes, Absolute 2.34 10*3/mm3      Monocytes, Absolute 1.03 10*3/mm3      Eosinophils, Absolute 0.01 10*3/mm3      Basophils, Absolute 0.04 10*3/mm3      Immature Grans, Absolute 0.05 10*3/mm3      nRBC 0.0 /100 WBC             CT Abdomen With Contrast    Result Date: 9/29/2023  1.  No acute intra-abdominal or pelvic inflammation is seen. 2.  Diffuse hepatic steatosis. 3.  There is evidence of prior gastric surgery. 4.  No abscess or obstruction is identified.    CT Angiogram Chest    Result Date: 9/29/2023  No evidence of pulmonary embolus.       I reviewed the patient's new clinical results.    Assessment/Plan:     Active and Resolved Problems  Active Hospital Problems    Diagnosis  POA    **Dehydration [E86.0]  Unknown    Transaminitis [R74.01]  Unknown    H/O gastric sleeve [Z90.3]  Not Applicable    Throat discomfort [R07.0]  Unknown      Resolved Hospital Problems    Diagnosis Date Resolved POA    Hypokalemia [E87.6] 09/30/2023 Yes    Nausea & vomiting [R11.2] 09/30/2023 Unknown     28 yo F with CMHx of gastric sleeve admitted for hypokalemia secondary to dehydration and GI losses from intractable vomiting that has resolved, now concern for dehydration and adequate po intake.     Dehydration   - 2L NS given in ED  - patient lost IV access, will order picc line if unable to tolerate  adequate po intake     Throat discomfort  - redness erythema and sores on tongue, likely from repeated vomiting   - Chloraseptic spray Q2 hrs for discomfort  - Magic mouthwash q6hrs    Nausea/vomiting - Resolved  - Avoiding zofran 2/2 prolonged Qtc, consider compazine vs ativan vs prednisone for n/v if needed  - EKG shows Qtc prolongation   - Tolerating PO, will order clear liquids diet  - Gastric emptying study ordered for Monday 10/2/2023  - GI aware of patient will be on board Monday  - lac improved, wnl  - Advance diet as tolerated     Hypokalemia - Resolved  - 2.4 in ED, repeat wnl this am  - received 10 mEq ordered in ED, 60 MeQ IVPB and 40mEq tablet overnight  - EKG showed sinus tachycardia, no U waves    Transaminitis  - fatty liver on CT abdomen  - LFTS improving    DVT prophylaxis:  Medical DVT prophylaxis orders are present.     Code status is   Code Status and Medical Interventions:   Ordered at: 09/29/23 1931     Code Status (Patient has no pulse and is not breathing):    CPR (Attempt to Resuscitate)     Medical Interventions (Patient has pulse or is breathing):    Full Support       Plan for disposition: Home in 1-2 days    Time: 30 minutes      This document has been electronically signed by Leonides Hernandez MD on September 30, 2023 08:37 CDT

## 2023-09-30 NOTE — PLAN OF CARE
Goal Outcome Evaluation:  Plan of Care Reviewed With: caregiver, patient        Progress: no change  Outcome Evaluation: Nutrition assessment per MD:  pt with a hx of gastric sleeve with resulting wt loss and inability to take oral po.  Persistent N/V.  She meets criteria for severe chronic malnutrition bassed on wt loss, inadequate oral intake, and NFPE revealing fat loss and muscle wasting.  will add supplements and monitor
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
## 2023-09-30 NOTE — ED NOTES
Nursing report ED to floor  Tnaika Molina  27 y.o.  female    HPI:   Chief Complaint   Patient presents with    Weakness - Generalized       Admitting doctor:   Rui Dior MD    Consulting provider(s):  Consults       No orders found from 8/31/2023 to 9/30/2023.             Admitting diagnosis:   The primary encounter diagnosis was Hypokalemia. Diagnoses of Dehydration, Nausea and vomiting, unspecified vomiting type, and History of bariatric surgery were also pertinent to this visit.    Code status:   Current Code Status       Date Active Code Status Order ID Comments User Context       Not on file            Allergies:   Erythromycin, Other, Sulfa antibiotics, and Vantin [cefpodoxime]    Intake and Output  No intake or output data in the 24 hours ending 09/29/23 1912    Weight:       09/29/23  1323   Weight: 59 kg (130 lb)       Most recent vitals:   Vitals:    09/29/23 1546 09/29/23 1621 09/29/23 1737 09/29/23 1839   BP: 113/68 115/62 125/87 122/88   BP Location:       Patient Position:       Pulse: 100 100 88 98   Resp: 20   20   Temp:       TempSrc:       SpO2: 94% 96% 97% 98%   Weight:       Height:         Oxygen Therapy: RA    Active LDAs/IV Access:   Lines, Drains & Airways       Active LDAs       Name Placement date Placement time Site Days    Peripheral IV 09/29/23 1623 Right Antecubital 09/29/23 1623  Antecubital  less than 1                    Labs (abnormal labs have a star):   Labs Reviewed   COMPREHENSIVE METABOLIC PANEL - Abnormal; Notable for the following components:       Result Value    Glucose 138 (*)     Creatinine 1.02 (*)     Sodium 131 (*)     Potassium 2.4 (*)     Chloride 83 (*)     ALT (SGPT) 50 (*)     AST (SGOT) 50 (*)     Anion Gap 19.0 (*)     All other components within normal limits    Narrative:     GFR Normal >60  Chronic Kidney Disease <60  Kidney Failure <15     CBC WITH AUTO DIFFERENTIAL - Abnormal; Notable for the following components:    RBC 5.59 (*)      Hemoglobin 16.4 (*)     Hematocrit 48.1 (*)     Eosinophil % 0.1 (*)     Neutrophils, Absolute 7.32 (*)     Monocytes, Absolute 1.03 (*)     All other components within normal limits   LIPASE - Abnormal; Notable for the following components:    Lipase 9 (*)     All other components within normal limits   TSH - Abnormal; Notable for the following components:    TSH 4.250 (*)     All other components within normal limits   LACTIC ACID, PLASMA - Abnormal; Notable for the following components:    Lactate 2.5 (*)     All other components within normal limits   HCG, SERUM, QUALITATIVE - Normal   T4, FREE - Normal    Narrative:     Results may be falsely increased if patient taking Biotin.     MAGNESIUM - Normal   RESPIRATORY PANEL PCR W/ COVID-19 (SARS-COV-2) [facility]/Q[facility]/MEAGAN/[facility] IN-HOUSE, NP SWAB IN [facility], 3-4 HR TAT   BLOOD CULTURE   BLOOD CULTURE   RAINBOW DRAW    Narrative:     The following orders were created for panel order [place] Draw.  Procedure                               Abnormality         Status                     ---------                               -----------         ------                     Green Top (Gel)[[id number]]                                  Final result               Lavender Top[[id number]]                                     Final result               Gold Top - SST[[id number]]                                   Final result               Light Blue Top[[id number]]                                   Final result                 Please view results for these tests on the individual orders.   URINALYSIS W/ MICROSCOPIC IF INDICATED (NO CULTURE)   LACTIC ACID, REFLEX   CBC AND DIFFERENTIAL    Narrative:     The following orders were created for panel order CBC & Differential.  Procedure                               Abnormality         Status                     ---------                               -----------         ------                     CBC Auto Differential[[id number]]         Abnormal            Final result                 Please view results for these tests on the individual orders.   GREEN TOP   LAVENDER TOP   GOLD TOP - SST   LIGHT BLUE TOP       Meds given in ED:   Medications   sodium chloride 0.9 % flush 10 mL (has no administration in time range)   sodium chloride 0.9 % bolus 2,000 mL (0 mL Intravenous Stopped 9/29/23 1800)   ondansetron (ZOFRAN) injection 4 mg (4 mg Intravenous Given 9/29/23 1623)   pantoprazole (PROTONIX) injection 40 mg (40 mg Intravenous Given 9/29/23 1623)   potassium chloride 10 mEq in 100 mL IVPB (0 mEq Intravenous Stopped 9/29/23 1800)   iopamidol (ISOVUE-370) 76 % injection 100 mL (75 mL Intravenous Given 9/29/23 1805)           NIH Stroke Scale:       Isolation/Infection(s):  No active isolations   COVID (rule out)     COVID Testing  Collected na  Resulted na    Nursing report ED to floor:  Mental status: A+O  Ambulatory status: independent   Precautions: na    ED nurse phone extentsiqd- [phone]

## 2023-09-30 NOTE — H&P
HISTORY AND PHYSICAL  NAME: Tanika Molina  : 1995  MRN: [id number]    DATE OF ADMISSION:  2023     DATE & TIME SEEN: 23 at 7:30 pm     PCP: Jena Mcclellan MD    CODE STATUS: Full code    CHIEF COMPLAINT:  nausea and vomiting    HPI:  Tanika Molina is a 27 y.o. female with a CMH of gastric sleeve 6 mo ago and asthma who presents complaining of nausea and vomiting. She states she has had nausea and vomiting since her gastric sleeve procedure 6 months ago. She has been to the ER several times for these symptoms. She has been evaluated by  with negative endoscopy and was treated with Reglan and Protonix. Today she states the nausea was worse and she developed shortness of breath and mouth soreness causing her to come to the ED. She does not smoke, no sick contacts, and no alcohol use.     ED course: CBC WNL, CMP shows potassium at 2.4 and elevated Creatinine at 1.02. CT scan of abdomen negative for acute findings, CT of chest negative for PE.     CONCURRENT MEDICAL HISTORY:  Past Medical History:   Diagnosis Date    Anemia     Asthma     Closed left scapular fracture     Concussion     Multiple rib fractures     left       PAST SURGICAL HISTORY:  Past Surgical History:   Procedure Laterality Date    ADENOIDECTOMY      APPENDECTOMY      CHOLECYSTECTOMY      TONSILLECTOMY         FAMILY HISTORY:  Family History   Problem Relation Age of Onset    No Known Problems Mother     No Known Problems Father     No Known Problems Sister     No Known Problems Brother     No Known Problems Daughter     No Known Problems Son     No Known Problems Maternal Aunt     No Known Problems Maternal Uncle     No Known Problems Paternal Aunt     No Known Problems Paternal Uncle     No Known Problems Maternal Grandmother     No Known Problems Maternal Grandfather     No Known Problems Paternal Grandmother     No Known Problems Paternal Grandfather         SOCIAL HISTORY:  Social History     Socioeconomic  History    Marital status:    Tobacco Use    Smoking status: Never    Smokeless tobacco: Never   Vaping Use    Vaping Use: Never used   Substance and Sexual Activity    Alcohol use: Yes     Comment: socially    Drug use: No    Sexual activity: Defer       HOME MEDICATIONS:  Prior to Admission medications    Medication Sig Start Date End Date Taking? Authorizing Provider   amitriptyline (ELAVIL) 10 MG tablet  11/29/21   Mohinder Alatorre MD   aspirin 81 MG chewable tablet Chew 1 tablet Daily. 6/29/23 6/29/24  Mohinder Alatorre MD   butalbital-acetaminophen-caffeine (FIORICET, ESGIC) -40 MG per tablet Take 1 tablet by mouth Every 6 (Six) Hours As Needed. 11/13/21   Mohinder Alatorre MD   Hyoscyamine Sulfate SL 0.125 MG sublingual tablet Take 0.25 mg by mouth 3 (Three) Times a Day. 5/17/23   Mohinder Alatorre MD   isosorbide mononitrate (IMDUR) 30 MG 24 hr tablet Take 1 tablet by mouth Daily. 7/9/23 8/9/23  Mohinder Alatorre MD   promethazine (PHENERGAN) 25 MG tablet Take 1 tablet by mouth Every 6 (Six) Hours As Needed for Nausea or Vomiting. 4/16/23   Олег Castro APRN   sertraline (ZOLOFT) 50 MG tablet TAKE 1 TABLET BY MOUTH DAILY 4/1/22   Pina Hyman MD       ALLERGIES:  Erythromycin, Other, Sulfa antibiotics, and Vantin [cefpodoxime]    REVIEW OF SYSTEMS  Review of Systems   Constitutional:  Positive for activity change, appetite change and fatigue. Negative for chills and fever.   HENT:  Positive for sore throat. Negative for congestion and postnasal drip.    Eyes:  Negative for visual disturbance.   Respiratory:  Positive for cough and shortness of breath. Negative for wheezing.    Cardiovascular:  Positive for palpitations. Negative for chest pain and leg swelling.   Gastrointestinal:  Positive for abdominal pain, constipation, diarrhea, nausea and vomiting.   Genitourinary:  Negative for decreased urine volume, difficulty urinating, dysuria, frequency and hematuria.    Musculoskeletal: Negative.    Skin: Negative.    Neurological:  Positive for weakness and light-headedness. Negative for dizziness and headaches.   Psychiatric/Behavioral: Negative.       PHYSICAL EXAM:  Temp:  [97.6 °F (36.4 °C)-98.2 °F (36.8 °C)] 98.2 °F (36.8 °C)  Heart Rate:  [] 92  Resp:  [20] 20  BP: ()/(62-88) 108/75  Body mass index is 19.77 kg/m².  Physical Exam  Constitutional:       Appearance: She is ill-appearing.   HENT:      Head: Normocephalic.   Eyes:      Extraocular Movements: Extraocular movements intact.      Conjunctiva/sclera: Conjunctivae normal.      Pupils: Pupils are equal, round, and reactive to light.   Cardiovascular:      Rate and Rhythm: Normal rate and regular rhythm.      Pulses: Normal pulses.      Heart sounds: Normal heart sounds.   Pulmonary:      Effort: Pulmonary effort is normal.      Breath sounds: Normal breath sounds.   Abdominal:      General: Abdomen is flat. Bowel sounds are normal.      Palpations: Abdomen is soft.      Tenderness: There is generalized abdominal tenderness. There is no guarding.   Musculoskeletal:         General: Normal range of motion.      Cervical back: Normal range of motion and neck supple.      Right lower leg: No edema.      Left lower leg: No edema.   Skin:     General: Skin is warm.      Capillary Refill: Capillary refill takes less than 2 seconds.   Neurological:      Mental Status: She is alert and oriented to person, place, and time.      Motor: Weakness present.      Gait: Gait normal.      Comments: Generalized weakness   Psychiatric:         Mood and Affect: Mood normal.         Behavior: Behavior normal.         Thought Content: Thought content normal.         Judgment: Judgment normal.       DIAGNOSTIC DATA:   Lab Results (last 24 hours)       Procedure Component Value Units Date/Time    Urinalysis, Microscopic Only - Urine, Clean Catch [[id number]]  (Abnormal) Collected: 09/29/23 1925    Specimen: Urine, Clean Catch  Updated: 09/29/23 1947     RBC, UA None Seen /HPF      WBC, UA 3-5 /HPF      Bacteria, UA Trace /HPF      Squamous Epithelial Cells, UA 3-5 /HPF      Hyaline Casts, UA None Seen /LPF      Methodology Manual Light Microscopy    Urinalysis With Microscopic If Indicated (No Culture) - Urine, Clean Catch [[id number]]  (Abnormal) Collected: 09/29/23 1925    Specimen: Urine, Clean Catch Updated: 09/29/23 1941     Color, UA Yellow     Appearance, UA Clear     pH, UA 6.0     Specific Gravity, UA 1.037     Comment: Result obtained by Refractometer        Glucose, UA Negative     Ketones, UA Trace     Bilirubin, UA Negative     Blood, UA Negative     Protein, UA 30 mg/dL (1+)     Leuk Esterase, UA Negative     Nitrite, UA Negative     Urobilinogen, UA 1.0 E.U./dL    Respiratory Panel PCR w/COVID-19(SARS-CoV-2) [facility]/MEAGAN/[facility] In-House, NP Swab in UTM/VTM, 3-4 HR TAT - Swab, Nasopharynx [[id number]]  (Abnormal) Collected: 09/29/23 1818    Specimen: Swab from Nasopharynx Updated: 09/29/23 1918     ADENOVIRUS, PCR Not Detected     Coronavirus 229E Not Detected     Coronavirus HKU1 Not Detected     Coronavirus NL63 Not Detected     Coronavirus OC43 Not Detected     COVID19 Not Detected     Human Metapneumovirus Not Detected     Human Rhinovirus/Enterovirus Detected     Influenza A PCR Not Detected     Influenza B PCR Not Detected     Parainfluenza Virus 1 Not Detected     Parainfluenza Virus 2 Not Detected     Parainfluenza Virus 3 Not Detected     Parainfluenza Virus 4 Not Detected     RSV, PCR Not Detected     Bordetella pertussis pcr Not Detected     Bordetella parapertussis PCR Not Detected     Chlamydophila pneumoniae PCR Not Detected     Mycoplasma pneumo by PCR Not Detected    Narrative:      In the setting of a positive respiratory panel with a viral infection PLUS a negative procalcitonin without other underlying concern for bacterial infection, consider observing off antibiotics or discontinuation of  antibiotics and continue supportive care. If the respiratory panel is positive for atypical bacterial infection (Bordetella pertussis, Chlamydophila pneumoniae, or Mycoplasma pneumoniae), consider antibiotic de-escalation to target atypical bacterial infection.    Blood Culture - Blood, Arm, Left [[id number]] Collected: 09/29/23 1839    Specimen: Blood from Arm, Left Updated: 09/29/23 1839    Lactic Acid, Plasma [[id number]]  (Abnormal) Collected: 09/29/23 1744    Specimen: Blood Updated: 09/29/23 1811     Lactate 2.5 mmol/L     TSH [[id number]]  (Abnormal) Collected: 09/29/23 1342    Specimen: Blood Updated: 09/29/23 1648     TSH 4.250 uIU/mL     T4, Free [[id number]]  (Normal) Collected: 09/29/23 1342    Specimen: Blood Updated: 09/29/23 1648     Free T4 1.37 ng/dL     Narrative:      Results may be falsely increased if patient taking Biotin.      Lipase [[id number]]  (Abnormal) Collected: 09/29/23 1342    Specimen: Blood Updated: 09/29/23 1636     Lipase 9 U/L     Magnesium [[id number]]  (Normal) Collected: 09/29/23 1342    Specimen: Blood Updated: 09/29/23 1636     Magnesium 2.0 mg/dL     hCG, Serum, Qualitative [[id number]]  (Normal) Collected: 09/29/23 1342    Specimen: Blood Updated: 09/29/23 1609     HCG Qualitative Negative    [place] Draw [[id number]] Collected: 09/29/23 1342    Specimen: Blood Updated: 09/29/23 1445    Narrative:      The following orders were created for panel order [place] [facility].  Procedure                               Abnormality         Status                     ---------                               -----------         ------                     Green Top (Gel)[[id number]]                                  Final result               Lavender Top[[id number]]                                     Final result               Gold Top - SST[[id number]]                                   Final result               Light Blue Top[[id number]]                                   Final result                  Please view results for these tests on the individual orders.    Green Top (Gel) [[id number]] Collected: 09/29/23 1342    Specimen: Blood Updated: 09/29/23 1445     Extra Tube Hold for add-ons.     Comment: Auto resulted.       Lavender Top [[id number]] Collected: 09/29/23 1342    Specimen: Blood Updated: 09/29/23 1445     Extra Tube hold for add-on     Comment: Auto resulted       Gold Top - SST [[id number]] Collected: 09/29/23 1342    Specimen: Blood Updated: 09/29/23 1445     Extra Tube Hold for add-ons.     Comment: Auto resulted.       Light Blue Top [[id number]] Collected: 09/29/23 1342    Specimen: Blood Updated: 09/29/23 1445     Extra Tube Hold for add-ons.     Comment: Auto resulted       Comprehensive Metabolic Panel [[id number]]  (Abnormal) Collected: 09/29/23 1342    Specimen: Blood Updated: 09/29/23 1419     Glucose 138 mg/dL      BUN 12 mg/dL      Creatinine 1.02 mg/dL      Sodium 131 mmol/L      Potassium 2.4 mmol/L      Comment: Slight hemolysis detected by analyzer. Results may be affected.        Chloride 83 mmol/L      CO2 29.0 mmol/L      Calcium 9.9 mg/dL      Total Protein 7.3 g/dL      Albumin 3.9 g/dL      ALT (SGPT) 50 U/L      AST (SGOT) 50 U/L      Alkaline Phosphatase 70 U/L      Total Bilirubin 0.7 mg/dL      Globulin 3.4 gm/dL      A/G Ratio 1.1 g/dL      BUN/Creatinine Ratio 11.8     Anion Gap 19.0 mmol/L      eGFR 78.0 mL/min/1.73     Narrative:      GFR Normal >60  Chronic Kidney Disease <60  Kidney Failure <15      CBC & Differential [[id number]]  (Abnormal) Collected: 09/29/23 1342    Specimen: Blood Updated: 09/29/23 1356    Narrative:      The following orders were created for panel order CBC & Differential.  Procedure                               Abnormality         Status                     ---------                               -----------         ------                     CBC Auto Differential[[id number]]        Abnormal            Final result                 Please view  results for these tests on the individual orders.    CBC Auto Differential [[id number]]  (Abnormal) Collected: 09/29/23 1342    Specimen: Blood Updated: 09/29/23 1356     WBC 10.79 10*3/mm3      RBC 5.59 10*6/mm3      Hemoglobin 16.4 g/dL      Hematocrit 48.1 %      MCV 86.0 fL      MCH 29.3 pg      MCHC 34.1 g/dL      RDW 13.2 %      RDW-SD 41.1 fl      MPV 10.7 fL      Platelets 276 10*3/mm3      Neutrophil % 67.8 %      Lymphocyte % 21.7 %      Monocyte % 9.5 %      Eosinophil % 0.1 %      Basophil % 0.4 %      Immature Grans % 0.5 %      Neutrophils, Absolute 7.32 10*3/mm3      Lymphocytes, Absolute 2.34 10*3/mm3      Monocytes, Absolute 1.03 10*3/mm3      Eosinophils, Absolute 0.01 10*3/mm3      Basophils, Absolute 0.04 10*3/mm3      Immature Grans, Absolute 0.05 10*3/mm3      nRBC 0.0 /100 WBC              Imaging Results (Last 24 Hours)       Procedure Component Value Units Date/Time    CT Abdomen With Contrast [[id number]] Collected: 09/29/23 1820     Updated: 09/29/23 1828    Narrative:      HISTORY:  Epigastric pain    EXAMINATION PERFORMED:  CT abdomen with intravenous contrast and oral contrast    TECHNIQUE:  Intravenous contrast was administered and axial images from the thoracic inlet  through the iliac crests were performed followed by 2D multiplanar reformats.    COMPARISON:  05/24/2023.    FINDINGS:  The lung bases are clear.  There is diffuse hepatic steatosis.  Gallbladder has  been removed.  Spleen is within normal limits.    The bilateral kidneys and adrenal glands are within normal limits.  The pancreas  is within normal limits.  There is evidence of prior gastric surgery.      Impression:      1.  No acute intra-abdominal or pelvic inflammation is seen.    2.  Diffuse hepatic steatosis.    3.  There is evidence of prior gastric surgery.    4.  No abscess or obstruction is identified.    CT Angiogram Chest [[id number]] Collected: 09/29/23 1816     Updated: 09/29/23 1825    Narrative:       HISTORY:  Shortness of breath    EXAMINATION PERFORMED:  CT pulmonary angiogram    TECHNIQUE:  IV contrast was administered and axial images from the thoracic inlet through  the diaphragms were performed.  3D multiplanar reformats of the thoracic aorta  were completed on a separate workstation under concurrent supervision.    FINDINGS:  There is no evidence of pulmonary embolus.  No infiltrate, effusion or  pneumothorax is seen.      Impression:      No evidence of pulmonary embolus.              I reviewed the patient's new clinical results.    ASSESSMENT AND PLAN: This is a 27 y.o. female with history of gastric sleeve admitted for nausea, vomiting, with dehydration and hypokalemia.     Active Hospital Problems    Diagnosis  POA    **Hypokalemia [E87.6]  Yes    H/O gastric sleeve [Z90.3]  Not Applicable    Nausea & vomiting [R11.2]  Unknown    Throat discomfort [R07.0]  Unknown    Dehydration [E86.0]  Unknown     Nausea/vomiting  - Zofran Q6 PRN  - EKG shows no Qtc prolongation   - Tolerating PO, will order clear liquids diet  - Gastric emptying study ordered for Monday 10/2/2023  - GI aware of patient will be on board Monday    Hypokalemia   - 2.4 in ED  - 10 mEq ordered in ED  - 60 MeQ to follow IVPB and 40mEq tablet   - Recheck CMP in am     Dehydration   - 2L NS given in ED   - 100ml/hr NS maintenance fluids     Throat discomfort  - redness erythema and sores on tongue, likely from repeated vomiting   - Chloraseptic spray Q2 hrs for discomfort      DVT prophylaxis: Lovenox     Tanika Molina and I have discussed pain goals for this hospitalization after reviewing her current clinical condition, medical history and prior pain experiences.  The goal is to keep the pain level controlled.  To help achieve this, I plan to order PRN medications.    PDMP reviewed and consistent with patient reported medications.    Expected Length of Stay: Where: home and When:  1-2 days    I discussed the patient's findings and  my recommendations with patient.     Rui Dior MD  is the attending on record at time of admission, She is aware of the patient's status and agrees with the above history and physical.        This document has been electronically signed by Janie Casillas MD on September 29, 2023 20:10 CDT

## 2023-09-30 NOTE — CONSULTS
Adult Nutrition  Assessment/PES    Patient Name:  Tanika Molina  YOB: 1995  MRN: [id number]  Admit Date:  9/29/2023    Assessment Date:  9/30/2023    Comments:  Nutrition consult per MD.  Pt admitted with intractable N/V and inability to tolerate po with resulting electrolyte abnormality.  Electrolytes have been replaced.  Pt has a hx of gastric sleeve surgery in March 2023.  Since her surgery she has not been able to tolerate a po diet due to persistent N/V and has visited multiple hospitals.  She reports that her UBW prior to surgery was 265#.  I cannot confirm that her wt was 265# in March 2023  but in February 2022 is recorded as 268#;  Wt hx also shows a drastic downward trend in her wt with wt in April 2023 -- 208#; July 2023 175# and #.  This is a 31.7% wt loss from 208# to her current wt of 142#.  She cannot tolerate meat or dairy products including boost, Ensure and milk. She has taken the Boost breeze before and did not have dumping syndrome from it.    I am very concerned about her nutritional decline and an uncertain what I can do to improve this since her symptoms seem to be the direct result of her surgery.  I have reached out to her physician regarding my concerns.  Gi will perform a gastric emptying study on Monday.   She meets ASPEN Criteria for chronic Malnutrition based on inadequate oral intake, wt loss of 31.7% since her surgery; and NFPE revealed fat loss and muscle wasting present, see MSA for details.    RD will monitor POC and po tolerance/intake.        Reason for Assessment       Row Name 09/30/23 1406          Reason for Assessment    Reason For Assessment physician consult     Diagnosis nutrition related history;gastrointestinal disease     Identified At Risk by Screening Criteria MST SCORE 2+;unintentional loss of 10 lbs or more in the past 2 mos;reduced oral intake over the last month                      Labs/Tests/Procedures/Meds       Row Name 09/30/23  "1409          Labs/Procedures/Meds    Lab Results Reviewed reviewed, pertinent     Lab Results Comments Na 134; ALt 35; Alb 3.1; Lipase 9        Diagnostic Tests/Procedures    Diagnostic Test/Procedure Reviewed reviewed, pertinent     Diagnostic Test/Procedures Comments IVF; GI consult        Medications    Pertinent Medications Reviewed reviewed, pertinent     Pertinent Medications Comments Pepcid; Magic Mouthwash;                    Physical Findings       Row Name 09/30/23 1410          Physical Findings    Overall Physical Appearance Per NFPE pt with fat loss and muscle wasting present.     Exam Agreement consented                    Estimated/Assessed Needs - Anthropometrics       Row Name 09/30/23 1412          Anthropometrics    Height for Calculation 1.727 m (5' 8\")     Weight for Calculation 64.4 kg (142 lb)        Estimated/Assessed Needs    Additional Documentation Additional Requirements (Group);Fluid Requirements (Group);Modified [place] Equation (Group);Estimated Calorie Needs (Group);KCAL/KG (Group);[place]-St. Jeor Equation (Group);Protein Requirements (Group)        KCAL/KG    KCAL/KG 30 Kcal/Kg (kcal)     30 Kcal/Kg (kcal) 1932.33        [place]-St. Jeor Equation    RMR ([place]-St. Jeor Equation) 1432.61        Protein Requirements    Weight Used For Protein Calculations 64.4 kg (142 lb)     Est Protein Requirement Amount (gms/kg) 1.4 gm protein     Estimated Protein Requirements (gms/day) 90.18        Fluid Requirements    Fluid Requirements (mL/day) 1900     Estimated Fluid Requirement Method RDA Method     RDA Method (mL) 1900                    Nutrition Prescription Ordered       Row Name 09/30/23 1419          Nutrition Prescription PO    Current PO Diet Regular     Fluid Consistency Thin     Common Modifiers GI Soft/[place]                    Evaluation of Received Nutrient/Fluid Intake       Row Name 09/30/23 1420          PO Evaluation    Number of Days PO Intake Evaluated Insufficient " Data     Number of Meals 1     % PO Intake Bites at breakfast                    Malnutrition Severity Assessment       Row Name 09/30/23 1421          Malnutrition Severity Assessment    Malnutrition Type Chronic Disease - Related Malnutrition        Insufficient Energy Intake     Insufficient Energy Intake Findings Severe     Insufficient Energy Intake  <75% of est. energy requirement for > or equal to 3 months        Unintentional Weight Loss     Unintentional Weight Loss Findings Severe     Unintentional Weight Loss  Weight loss greater than 10% in six months        Muscle Loss    Loss of Muscle Mass Findings Severe     Anabaptism Region Moderate - slight depression     Clavicle Bone Region Severe - protruding prominent bone     Acromion Bone Region Severe - squared shoulders, bones, and acromion process protrusion prominent     Scapular Bone Region Moderate - mild depression, bones may show slightly     Dorsal Hand Region Moderate - slight depression     Patellar Region Moderate - patella more prominent, less muscle definition around patella     Anterior Thigh Region Severe - line/depression along thigh, obviously thin     Posterior Calf Region Severe - thin with very little definition/firmness        Fat Loss    Subcutaneous Fat Loss Findings Severe     Orbital Region  Moderate -  somewhat hollowness, slightly dark circles     Upper Arm Region Severe - mostly skin, very little space between folds, fingers touch     Thoracic & Lumbar Region Severe - ribs visible with prominent depressions, iliac crest very prominent        Criteria Met (Must meet criteria for severity in at least 2 of these categories: M Wasting, Fat Loss, Fluid, Secondary Signs, Wt. Status, Intake)    Patient meets criteria for  Severe Malnutrition                     Problem/Interventions:   Problem 1       Row Name 09/30/23 1422          Nutrition Diagnoses Problem 1    Problem 1 Inadequate Intake/Infusion     Inadequate Intake Type Oral      Macronutrient Kcal;Protein     Micronutrient Vitamin;Mineral     Etiology (related to) Medical Diagnosis     Gastrointestinal Nausea;Vomiting     Other Hx of Gastric Sleeve in March 2023     Signs/Symptoms (evidenced by) PO diet not tolerated;Unintended Weight Change     Unintended Weight Change Loss     Number of Pounds Lost Pt reports UBW prior to surgery 265#; Recorded wt in Feb 2022 was 268#; 4/2023 208#; 7/2023 175#; CBW: 142# (this is a 31.7% wt loss     Other Comment Per NFPE pt with fat loss and muscle wasting present.                    Problem 2       Row Name 09/30/23 1426          Nutrition Diagnoses Problem 2    Problem 2 Inadequate Intake/Infusion     Inadequate Intake Type Oral     Macronutrient Kcal;Protein     Micronutrient Vitamin;Mineral     Etiology (related to) Medical Diagnosis;Factors Affecting Nutrition     Gastrointestinal Nausea;Vomiting     Appetite Poor;Poor Due to GI Factor     Signs/Symptoms (evidenced by) PO diet not tolerated                        Intervention Goal       Row Name 09/30/23 1427          Intervention Goal    General Reduce/improve symptoms;Meet nutritional needs for age/condition     PO Tolerate PO;Increase intake;Meet estimated needs     Weight Maintain weight                    Nutrition Intervention       Row Name 09/30/23 1428          Nutrition Intervention    RD/Tech Action Follow Tx progress;Care plan reviewd;Encourage intake;Recommend/ordered     Recommended/Ordered Supplement                    Nutrition Prescription       Row Name 09/30/23 1428          Nutrition Prescription PO    PO Prescription Begin/change supplement     Supplement Boost Breeze     Supplement Frequency 3 times a day     New PO Prescription Ordered? Yes                    Education/Evaluation       Row Name 09/30/23 1428          Education    Education Education topics;Advised regarding habits/behavior     Education Topics Basic nutrition;Protein     Advised Regarding Habits/Behavior  Increased nutrient density;Snacks;Use supplement;Food choices        Monitor/Evaluation    Monitor Per protocol;I&O;Supplement intake;Pertinent labs;PO intake;Weight;Skin status;Symptoms     Education Follow-up Reinforce PRN                     Electronically signed by:  Octavia Lee RD  09/30/23 14:30 CDT

## 2023-09-30 NOTE — NURSING NOTE
Patient lost IV access around 2345, we were unable to get another peripheral IV, lab is having difficulty collecting lab samples, have contacted MD on call about lack of IV.

## 2023-09-30 NOTE — ACP (ADVANCE CARE PLANNING)
$1700+ copay and does not qualify for copay card with [employer].  ABD will send us the forms for the patient assistance program. I discussed risks and benefits of code status with patient. In the event breathing or heart is to stop, she wishes to be full code. Patient interventions: CPR and intubation . In the event she cannot make medical decisions, her grandmother has been designated as healthcare surrogate. She does not have advanced directive on file.            This document has been electronically signed by Janie Casillas MD on September 29, 2023 20:07 CDT

## 2023-09-30 NOTE — PAYOR COMM NOTE
"  Lili polanco rn [facility] phone [phone]  Cm fax [phone]  Inpatient admission        Rufino Anders (27 y.o. Female)       Date of Birth   1995    Social Security Number       Address   [address] [place], [place] [address]    Home Phone   [phone]    MRN   [id number]       Faith   Sabianism    Marital Status                               Admission Date   23    Admission Type   Emergency    Admitting Provider   Rui Dior MD    Attending Provider   Rui Dior MD    Department, Room/Bed   [address], 431/1       Discharge Date       Discharge Disposition       Discharge Destination                                 Attending Provider: Rui Dior MD    Allergies: Erythromycin, Other, Sulfa Antibiotics, Vantin [Cefpodoxime]    Isolation: None   Infection: COVID (rule out) (23), Rhinovirus  (23)   Code Status: CPR    Ht: 172.7 cm (68\")   Wt: 59 kg (130 lb)    Admission Cmt: None   Principal Problem: Hypokalemia [E87.6]                   Active Insurance as of 2023       Primary Coverage       Payor Plan Insurance Group Employer/Plan Group    [employer] [place] [employer] [place]ICA[place]        Payor Plan Address Payor Plan Phone Number Payor Plan Fax Number Effective Dates    [address] [address] [phone]  3/16/2017 - None Entered    [facility] [id number]         Subscriber Name Subscriber Birth Date Member ID       RUFINO ANDERS 1995 [id number]                     Emergency Contacts        (Rel.) Home Phone Work Phone Mobile Phone    Katy Penn (Mother) [phone] -- [phone]    Tiffanie Stanton (Grandparent) [phone] -- [phone]                 History & Physical        Janie Casillas MD at 23              HISTORY AND PHYSICAL  NAME: Rufino Anders  : 1995  MRN: [id number]    DATE OF ADMISSION:  2023     DATE & TIME SEEN: 23 " at 7:30 pm     PCP: Jena Mcclellan MD    CODE STATUS: Full code    CHIEF COMPLAINT:  nausea and vomiting    HPI:  Tanika Molina is a 27 y.o. female with a CMH of gastric sleeve 6 mo ago and asthma who presents complaining of nausea and vomiting. She states she has had nausea and vomiting since her gastric sleeve procedure 6 months ago. She has been to the ER several times for these symptoms. She has been evaluated by  with negative endoscopy and was treated with Reglan and Protonix. Today she states the nausea was worse and she developed shortness of breath and mouth soreness causing her to come to the ED. She does not smoke, no sick contacts, and no alcohol use.     ED course: CBC WNL, CMP shows potassium at 2.4 and elevated Creatinine at 1.02. CT scan of abdomen negative for acute findings, CT of chest negative for PE.     CONCURRENT MEDICAL HISTORY:  Past Medical History:   Diagnosis Date    Anemia     Asthma     Closed left scapular fracture     Concussion     Multiple rib fractures     left       PAST SURGICAL HISTORY:  Past Surgical History:   Procedure Laterality Date    ADENOIDECTOMY      APPENDECTOMY      CHOLECYSTECTOMY      TONSILLECTOMY         FAMILY HISTORY:  Family History   Problem Relation Age of Onset    No Known Problems Mother     No Known Problems Father     No Known Problems Sister     No Known Problems Brother     No Known Problems Daughter     No Known Problems Son     No Known Problems Maternal Aunt     No Known Problems Maternal Uncle     No Known Problems Paternal Aunt     No Known Problems Paternal Uncle     No Known Problems Maternal Grandmother     No Known Problems Maternal Grandfather     No Known Problems Paternal Grandmother     No Known Problems Paternal Grandfather         SOCIAL HISTORY:  Social History     Socioeconomic History    Marital status:    Tobacco Use    Smoking status: Never    Smokeless tobacco: Never   Vaping Use    Vaping Use: Never used    Substance and Sexual Activity    Alcohol use: Yes     Comment: socially    Drug use: No    Sexual activity: Defer       HOME MEDICATIONS:  Prior to Admission medications    Medication Sig Start Date End Date Taking? Authorizing Provider   amitriptyline (ELAVIL) 10 MG tablet  11/29/21   Mohinder Alatorre MD   aspirin 81 MG chewable tablet Chew 1 tablet Daily. 6/29/23 6/29/24  Mohinder Alatorre MD   butalbital-acetaminophen-caffeine (FIORICET, ESGIC) -40 MG per tablet Take 1 tablet by mouth Every 6 (Six) Hours As Needed. 11/13/21   Mohinder Alatorre MD   Hyoscyamine Sulfate SL 0.125 MG sublingual tablet Take 0.25 mg by mouth 3 (Three) Times a Day. 5/17/23   Mohinder Alatorre MD   isosorbide mononitrate (IMDUR) 30 MG 24 hr tablet Take 1 tablet by mouth Daily. 7/9/23 8/9/23  Mohinder Alatorre MD   promethazine (PHENERGAN) 25 MG tablet Take 1 tablet by mouth Every 6 (Six) Hours As Needed for Nausea or Vomiting. 4/16/23   Олег Castro APRN   sertraline (ZOLOFT) 50 MG tablet TAKE 1 TABLET BY MOUTH DAILY 4/1/22   Pina Hyman MD       ALLERGIES:  Erythromycin, Other, Sulfa antibiotics, and Vantin [cefpodoxime]    REVIEW OF SYSTEMS  Review of Systems   Constitutional:  Positive for activity change, appetite change and fatigue. Negative for chills and fever.   HENT:  Positive for sore throat. Negative for congestion and postnasal drip.    Eyes:  Negative for visual disturbance.   Respiratory:  Positive for cough and shortness of breath. Negative for wheezing.    Cardiovascular:  Positive for palpitations. Negative for chest pain and leg swelling.   Gastrointestinal:  Positive for abdominal pain, constipation, diarrhea, nausea and vomiting.   Genitourinary:  Negative for decreased urine volume, difficulty urinating, dysuria, frequency and hematuria.   Musculoskeletal: Negative.    Skin: Negative.    Neurological:  Positive for weakness and light-headedness. Negative for dizziness and  headaches.   Psychiatric/Behavioral: Negative.       PHYSICAL EXAM:  Temp:  [97.6 °F (36.4 °C)-98.2 °F (36.8 °C)] 98.2 °F (36.8 °C)  Heart Rate:  [] 92  Resp:  [20] 20  BP: ()/(62-88) 108/75  Body mass index is 19.77 kg/m².  Physical Exam  Constitutional:       Appearance: She is ill-appearing.   HENT:      Head: Normocephalic.   Eyes:      Extraocular Movements: Extraocular movements intact.      Conjunctiva/sclera: Conjunctivae normal.      Pupils: Pupils are equal, round, and reactive to light.   Cardiovascular:      Rate and Rhythm: Normal rate and regular rhythm.      Pulses: Normal pulses.      Heart sounds: Normal heart sounds.   Pulmonary:      Effort: Pulmonary effort is normal.      Breath sounds: Normal breath sounds.   Abdominal:      General: Abdomen is flat. Bowel sounds are normal.      Palpations: Abdomen is soft.      Tenderness: There is generalized abdominal tenderness. There is no guarding.   Musculoskeletal:         General: Normal range of motion.      Cervical back: Normal range of motion and neck supple.      Right lower leg: No edema.      Left lower leg: No edema.   Skin:     General: Skin is warm.      Capillary Refill: Capillary refill takes less than 2 seconds.   Neurological:      Mental Status: She is alert and oriented to person, place, and time.      Motor: Weakness present.      Gait: Gait normal.      Comments: Generalized weakness   Psychiatric:         Mood and Affect: Mood normal.         Behavior: Behavior normal.         Thought Content: Thought content normal.         Judgment: Judgment normal.       DIAGNOSTIC DATA:   Lab Results (last 24 hours)       Procedure Component Value Units Date/Time    Urinalysis, Microscopic Only - Urine, Clean Catch [[id number]]  (Abnormal) Collected: 09/29/23 1925    Specimen: Urine, Clean Catch Updated: 09/29/23 1947     RBC, UA None Seen /HPF      WBC, UA 3-5 /HPF      Bacteria, UA Trace /HPF      Squamous Epithelial Cells, UA 3-5  /HPF      Hyaline Casts, UA None Seen /LPF      Methodology Manual Light Microscopy    Urinalysis With Microscopic If Indicated (No Culture) - Urine, Clean Catch [[id number]]  (Abnormal) Collected: 09/29/23 1925    Specimen: Urine, Clean Catch Updated: 09/29/23 1941     Color, UA Yellow     Appearance, UA Clear     pH, UA 6.0     Specific Gravity, UA 1.037     Comment: Result obtained by Refractometer        Glucose, UA Negative     Ketones, UA Trace     Bilirubin, UA Negative     Blood, UA Negative     Protein, UA 30 mg/dL (1+)     Leuk Esterase, UA Negative     Nitrite, UA Negative     Urobilinogen, UA 1.0 E.U./dL    Respiratory Panel PCR w/COVID-19(SARS-CoV-2) SONAM/QIAN/MEAGAN/PAD/COR/MAD/AMALIA In-House, NP Swab in UTM/VTM, 3-4 HR TAT - Swab, Nasopharynx [[id number]]  (Abnormal) Collected: 09/29/23 1818    Specimen: Swab from Nasopharynx Updated: 09/29/23 1918     ADENOVIRUS, PCR Not Detected     Coronavirus 229E Not Detected     Coronavirus HKU1 Not Detected     Coronavirus NL63 Not Detected     Coronavirus OC43 Not Detected     COVID19 Not Detected     Human Metapneumovirus Not Detected     Human Rhinovirus/Enterovirus Detected     Influenza A PCR Not Detected     Influenza B PCR Not Detected     Parainfluenza Virus 1 Not Detected     Parainfluenza Virus 2 Not Detected     Parainfluenza Virus 3 Not Detected     Parainfluenza Virus 4 Not Detected     RSV, PCR Not Detected     Bordetella pertussis pcr Not Detected     Bordetella parapertussis PCR Not Detected     Chlamydophila pneumoniae PCR Not Detected     Mycoplasma pneumo by PCR Not Detected    Narrative:      In the setting of a positive respiratory panel with a viral infection PLUS a negative procalcitonin without other underlying concern for bacterial infection, consider observing off antibiotics or discontinuation of antibiotics and continue supportive care. If the respiratory panel is positive for atypical bacterial infection (Bordetella pertussis, Chlamydophila  pneumoniae, or Mycoplasma pneumoniae), consider antibiotic de-escalation to target atypical bacterial infection.    Blood Culture - Blood, Arm, Left [[id number]] Collected: 09/29/23 1839    Specimen: Blood from Arm, Left Updated: 09/29/23 1839    Lactic Acid, Plasma [[id number]]  (Abnormal) Collected: 09/29/23 1744    Specimen: Blood Updated: 09/29/23 1811     Lactate 2.5 mmol/L     TSH [[id number]]  (Abnormal) Collected: 09/29/23 1342    Specimen: Blood Updated: 09/29/23 1648     TSH 4.250 uIU/mL     T4, Free [[id number]]  (Normal) Collected: 09/29/23 1342    Specimen: Blood Updated: 09/29/23 1648     Free T4 1.37 ng/dL     Narrative:      Results may be falsely increased if patient taking Biotin.      Lipase [[id number]]  (Abnormal) Collected: 09/29/23 1342    Specimen: Blood Updated: 09/29/23 1636     Lipase 9 U/L     Magnesium [[id number]]  (Normal) Collected: 09/29/23 1342    Specimen: Blood Updated: 09/29/23 1636     Magnesium 2.0 mg/dL     hCG, Serum, Qualitative [[id number]]  (Normal) Collected: 09/29/23 1342    Specimen: Blood Updated: 09/29/23 1609     HCG Qualitative Negative    [facility] [place] Draw [[id number]] Collected: 09/29/23 1342    Specimen: Blood Updated: 09/29/23 1445    Narrative:      The following orders were created for panel order [facility] [place] Draw.  Procedure                               Abnormality         Status                     ---------                               -----------         ------                     Green Top (Gel)[[id number]]                                  Final result               Lavender Top[[id number]]                                     Final result               Gold Top - SST[[id number]]                                   Final result               Light Blue Top[[id number]]                                   Final result                 Please view results for these tests on the individual orders.    Green Top (Gel) [[id number]] Collected: 09/29/23 1342    Specimen: Blood Updated:  09/29/23 1445     Extra Tube Hold for add-ons.     Comment: Auto resulted.       Lavender Top [[id number]] Collected: 09/29/23 1342    Specimen: Blood Updated: 09/29/23 1445     Extra Tube hold for add-on     Comment: Auto resulted       Gold Top - SST [[id number]] Collected: 09/29/23 1342    Specimen: Blood Updated: 09/29/23 1445     Extra Tube Hold for add-ons.     Comment: Auto resulted.       Light Blue Top [[id number]] Collected: 09/29/23 1342    Specimen: Blood Updated: 09/29/23 1445     Extra Tube Hold for add-ons.     Comment: Auto resulted       Comprehensive Metabolic Panel [[id number]]  (Abnormal) Collected: 09/29/23 1342    Specimen: Blood Updated: 09/29/23 1419     Glucose 138 mg/dL      BUN 12 mg/dL      Creatinine 1.02 mg/dL      Sodium 131 mmol/L      Potassium 2.4 mmol/L      Comment: Slight hemolysis detected by analyzer. Results may be affected.        Chloride 83 mmol/L      CO2 29.0 mmol/L      Calcium 9.9 mg/dL      Total Protein 7.3 g/dL      Albumin 3.9 g/dL      ALT (SGPT) 50 U/L      AST (SGOT) 50 U/L      Alkaline Phosphatase 70 U/L      Total Bilirubin 0.7 mg/dL      Globulin 3.4 gm/dL      A/G Ratio 1.1 g/dL      BUN/Creatinine Ratio 11.8     Anion Gap 19.0 mmol/L      eGFR 78.0 mL/min/1.73     Narrative:      GFR Normal >60  Chronic Kidney Disease <60  Kidney Failure <15      CBC & Differential [[id number]]  (Abnormal) Collected: 09/29/23 1342    Specimen: Blood Updated: 09/29/23 1356    Narrative:      The following orders were created for panel order CBC & Differential.  Procedure                               Abnormality         Status                     ---------                               -----------         ------                     CBC Auto Differential[[id number]]        Abnormal            Final result                 Please view results for these tests on the individual orders.    CBC Auto Differential [[id number]]  (Abnormal) Collected: 09/29/23 1342    Specimen: Blood Updated:  09/29/23 1356     WBC 10.79 10*3/mm3      RBC 5.59 10*6/mm3      Hemoglobin 16.4 g/dL      Hematocrit 48.1 %      MCV 86.0 fL      MCH 29.3 pg      MCHC 34.1 g/dL      RDW 13.2 %      RDW-SD 41.1 fl      MPV 10.7 fL      Platelets 276 10*3/mm3      Neutrophil % 67.8 %      Lymphocyte % 21.7 %      Monocyte % 9.5 %      Eosinophil % 0.1 %      Basophil % 0.4 %      Immature Grans % 0.5 %      Neutrophils, Absolute 7.32 10*3/mm3      Lymphocytes, Absolute 2.34 10*3/mm3      Monocytes, Absolute 1.03 10*3/mm3      Eosinophils, Absolute 0.01 10*3/mm3      Basophils, Absolute 0.04 10*3/mm3      Immature Grans, Absolute 0.05 10*3/mm3      nRBC 0.0 /100 WBC              Imaging Results (Last 24 Hours)       Procedure Component Value Units Date/Time    CT Abdomen With Contrast [[id number]] Collected: 09/29/23 1820     Updated: 09/29/23 1828    Narrative:      HISTORY:  Epigastric pain    EXAMINATION PERFORMED:  CT abdomen with intravenous contrast and oral contrast    TECHNIQUE:  Intravenous contrast was administered and axial images from the thoracic inlet  through the iliac crests were performed followed by 2D multiplanar reformats.    COMPARISON:  05/24/2023.    FINDINGS:  The lung bases are clear.  There is diffuse hepatic steatosis.  Gallbladder has  been removed.  Spleen is within normal limits.    The bilateral kidneys and adrenal glands are within normal limits.  The pancreas  is within normal limits.  There is evidence of prior gastric surgery.      Impression:      1.  No acute intra-abdominal or pelvic inflammation is seen.    2.  Diffuse hepatic steatosis.    3.  There is evidence of prior gastric surgery.    4.  No abscess or obstruction is identified.    CT Angiogram Chest [[id number]] Collected: 09/29/23 1816     Updated: 09/29/23 1825    Narrative:      HISTORY:  Shortness of breath    EXAMINATION PERFORMED:  CT pulmonary angiogram    TECHNIQUE:  IV contrast was administered and axial images from the thoracic  inlet through  the diaphragms were performed.  3D multiplanar reformats of the thoracic aorta  were completed on a separate workstation under concurrent supervision.    FINDINGS:  There is no evidence of pulmonary embolus.  No infiltrate, effusion or  pneumothorax is seen.      Impression:      No evidence of pulmonary embolus.              I reviewed the patient's new clinical results.    ASSESSMENT AND PLAN: This is a 27 y.o. female with history of gastric sleeve admitted for nausea, vomiting, with dehydration and hypokalemia.     Active Hospital Problems    Diagnosis  POA    **Hypokalemia [E87.6]  Yes    H/O gastric sleeve [Z90.3]  Not Applicable    Nausea & vomiting [R11.2]  Unknown    Throat discomfort [R07.0]  Unknown    Dehydration [E86.0]  Unknown     Nausea/vomiting  - Zofran Q6 PRN  - EKG shows no Qtc prolongation   - Tolerating PO, will order clear liquids diet  - Gastric emptying study ordered for Monday 10/2/2023  - GI aware of patient will be on board Monday    Hypokalemia   - 2.4 in ED  - 10 mEq ordered in ED  - 60 MeQ to follow IVPB and 40mEq tablet   - Recheck CMP in am     Dehydration   - 2L NS given in ED   - 100ml/hr NS maintenance fluids     Throat discomfort  - redness erythema and sores on tongue, likely from repeated vomiting   - Chloraseptic spray Q2 hrs for discomfort      DVT prophylaxis: Lovenox     Tanika Deonte Molina and I have discussed pain goals for this hospitalization after reviewing her current clinical condition, medical history and prior pain experiences.  The goal is to keep the pain level controlled.  To help achieve this, I plan to order PRN medications.    PDMP reviewed and consistent with patient reported medications.    Expected Length of Stay: Where: home and When:  1-2 days    I discussed the patient's findings and my recommendations with patient.     uRi Dior MD  is the attending on record at time of admission, She is aware of the patient's status and agrees with the  above history and physical.        This document has been electronically signed by Janie Casillas MD on September 29, 2023 20:10 CDT        Electronically signed by Janie Casillas MD at 09/29/23 2021          Emergency Department Notes        Chen Rod, RN at 09/29/23 1912          Nursing report ED to floor  Tanika Molina  27 y.o.  female    HPI:   Chief Complaint   Patient presents with    Weakness - Generalized       Admitting doctor:   Rui Dior MD    Consulting provider(s):  Consults       No orders found from 8/31/2023 to 9/30/2023.             Admitting diagnosis:   The primary encounter diagnosis was Hypokalemia. Diagnoses of Dehydration, Nausea and vomiting, unspecified vomiting type, and History of bariatric surgery were also pertinent to this visit.    Code status:   Current Code Status       Date Active Code Status Order ID Comments User Context       Not on file            Allergies:   Erythromycin, Other, Sulfa antibiotics, and Vantin [cefpodoxime]    Intake and Output  No intake or output data in the 24 hours ending 09/29/23 1912    Weight:       09/29/23  1323   Weight: 59 kg (130 lb)       Most recent vitals:   Vitals:    09/29/23 1546 09/29/23 1621 09/29/23 1737 09/29/23 1839   BP: 113/68 115/62 125/87 122/88   BP Location:       Patient Position:       Pulse: 100 100 88 98   Resp: 20   20   Temp:       TempSrc:       SpO2: 94% 96% 97% 98%   Weight:       Height:         Oxygen Therapy: RA    Active LDAs/IV Access:   Lines, Drains & Airways       Active LDAs       Name Placement date Placement time Site Days    Peripheral IV 09/29/23 1623 Right Antecubital 09/29/23 1623  Antecubital  less than 1                    Labs (abnormal labs have a star):   Labs Reviewed   COMPREHENSIVE METABOLIC PANEL - Abnormal; Notable for the following components:       Result Value    Glucose 138 (*)     Creatinine 1.02 (*)     Sodium 131 (*)     Potassium 2.4 (*)     Chloride  83 (*)     ALT (SGPT) 50 (*)     AST (SGOT) 50 (*)     Anion Gap 19.0 (*)     All other components within normal limits    Narrative:     GFR Normal >60  Chronic Kidney Disease <60  Kidney Failure <15     CBC WITH AUTO DIFFERENTIAL - Abnormal; Notable for the following components:    RBC 5.59 (*)     Hemoglobin 16.4 (*)     Hematocrit 48.1 (*)     Eosinophil % 0.1 (*)     Neutrophils, Absolute 7.32 (*)     Monocytes, Absolute 1.03 (*)     All other components within normal limits   LIPASE - Abnormal; Notable for the following components:    Lipase 9 (*)     All other components within normal limits   TSH - Abnormal; Notable for the following components:    TSH 4.250 (*)     All other components within normal limits   LACTIC ACID, PLASMA - Abnormal; Notable for the following components:    Lactate 2.5 (*)     All other components within normal limits   HCG, SERUM, QUALITATIVE - Normal   T4, FREE - Normal    Narrative:     Results may be falsely increased if patient taking Biotin.     MAGNESIUM - Normal   RESPIRATORY PANEL PCR W/ COVID-19 (SARS-COV-2) SONAM/QIAN/MEAGAN/PAD/COR/MAD/AMALIA IN-HOUSE, NP SWAB IN [facility], 3-4 HR TAT   BLOOD CULTURE   BLOOD CULTURE   RAINBOW DRAW    Narrative:     The following orders were created for panel order [facility] Draw.  Procedure                               Abnormality         Status                     ---------                               -----------         ------                     Green Top (Gel)[[id number]]                                  Final result               Lavender Top[[id number]]                                     Final result               Gold Top - SST[[id number]]                                   Final result               Light Blue Top[[id number]]                                   Final result                 Please view results for these tests on the individual orders.   URINALYSIS W/ MICROSCOPIC IF INDICATED (NO CULTURE)   LACTIC ACID, REFLEX   CBC AND DIFFERENTIAL     Narrative:     The following orders were created for panel order CBC & Differential.  Procedure                               Abnormality         Status                     ---------                               -----------         ------                     CBC Auto Differential[[id number]]        Abnormal            Final result                 Please view results for these tests on the individual orders.   GREEN TOP   LAVENDER TOP   GOLD TOP - SST   LIGHT BLUE TOP       Meds given in ED:   Medications   sodium chloride 0.9 % flush 10 mL (has no administration in time range)   sodium chloride 0.9 % bolus 2,000 mL (0 mL Intravenous Stopped 9/29/23 1800)   ondansetron (ZOFRAN) injection 4 mg (4 mg Intravenous Given 9/29/23 1623)   pantoprazole (PROTONIX) injection 40 mg (40 mg Intravenous Given 9/29/23 1623)   potassium chloride 10 mEq in 100 mL IVPB (0 mEq Intravenous Stopped 9/29/23 1800)   iopamidol (ISOVUE-370) 76 % injection 100 mL (75 mL Intravenous Given 9/29/23 1805)           NIH Stroke Scale:       Isolation/Infection(s):  No active isolations   COVID (rule out)     COVID Testing  Collected na  Resulted na    Nursing report ED to floor:  Mental status: A+O  Ambulatory status: independent   Precautions: na    ED nurse phone extentapmd- [phone]    Electronically signed by Chen Rod, RN at 09/29/23 1913       Physician Progress Notes (last 24 hours)  Notes from 09/28/23 2133 through 09/29/23 2133   No notes of this type exist for this encounter.

## 2023-10-04 LAB — BACTERIA SPEC AEROBE CULT: NORMAL

## 2023-10-05 LAB — BACTERIA SPEC AEROBE CULT: NORMAL
